# Patient Record
Sex: MALE | Race: WHITE | NOT HISPANIC OR LATINO
[De-identification: names, ages, dates, MRNs, and addresses within clinical notes are randomized per-mention and may not be internally consistent; named-entity substitution may affect disease eponyms.]

---

## 2018-02-09 ENCOUNTER — APPOINTMENT (OUTPATIENT)
Dept: UROLOGY | Facility: CLINIC | Age: 78
End: 2018-02-09
Payer: MEDICARE

## 2018-02-09 ENCOUNTER — RESULT CHARGE (OUTPATIENT)
Age: 78
End: 2018-02-09

## 2018-02-09 ENCOUNTER — APPOINTMENT (OUTPATIENT)
Dept: UROLOGY | Facility: CLINIC | Age: 78
End: 2018-02-09

## 2018-02-09 VITALS
HEART RATE: 77 BPM | TEMPERATURE: 98.8 F | SYSTOLIC BLOOD PRESSURE: 181 MMHG | DIASTOLIC BLOOD PRESSURE: 67 MMHG | RESPIRATION RATE: 16 BRPM

## 2018-02-09 DIAGNOSIS — N48.1 BALANITIS: ICD-10-CM

## 2018-02-09 LAB
BILIRUB UR QL STRIP: NORMAL
CLARITY UR: CLEAR
COLLECTION METHOD: NORMAL
GLUCOSE UR-MCNC: NORMAL
HCG UR QL: 0.2 EU/DL
HGB UR QL STRIP.AUTO: NORMAL
KETONES UR-MCNC: NORMAL
LEUKOCYTE ESTERASE UR QL STRIP: NORMAL
NITRITE UR QL STRIP: NORMAL
PH UR STRIP: 6
PROT UR STRIP-MCNC: NORMAL
SP GR UR STRIP: 1.01

## 2018-02-09 PROCEDURE — 51798 US URINE CAPACITY MEASURE: CPT

## 2018-02-09 PROCEDURE — 99213 OFFICE O/P EST LOW 20 MIN: CPT

## 2018-02-09 RX ORDER — CLOTRIMAZOLE AND BETAMETHASONE DIPROPIONATE 10; .5 MG/G; MG/G
1-0.05 CREAM TOPICAL TWICE DAILY
Qty: 45 | Refills: 1 | Status: ACTIVE | COMMUNITY
Start: 2018-02-09 | End: 1900-01-01

## 2018-02-13 LAB — CORE LAB FLUID CYTOLOGY: NORMAL

## 2018-02-16 ENCOUNTER — EMERGENCY (EMERGENCY)
Facility: HOSPITAL | Age: 78
LOS: 1 days | Discharge: HOME | End: 2018-02-16
Attending: EMERGENCY MEDICINE
Payer: MEDICARE

## 2018-02-16 ENCOUNTER — OUTPATIENT (OUTPATIENT)
Dept: OUTPATIENT SERVICES | Facility: HOSPITAL | Age: 78
LOS: 1 days | End: 2018-02-16

## 2018-02-16 ENCOUNTER — APPOINTMENT (OUTPATIENT)
Dept: MRI IMAGING | Facility: CLINIC | Age: 78
End: 2018-02-16
Payer: MEDICARE

## 2018-02-16 VITALS
TEMPERATURE: 98 F | DIASTOLIC BLOOD PRESSURE: 73 MMHG | SYSTOLIC BLOOD PRESSURE: 156 MMHG | HEIGHT: 65 IN | WEIGHT: 192.02 LBS | OXYGEN SATURATION: 94 % | RESPIRATION RATE: 18 BRPM | HEART RATE: 67 BPM

## 2018-02-16 DIAGNOSIS — Z98.890 OTHER SPECIFIED POSTPROCEDURAL STATES: Chronic | ICD-10-CM

## 2018-02-16 DIAGNOSIS — R31.0 GROSS HEMATURIA: ICD-10-CM

## 2018-02-16 LAB
ALBUMIN SERPL ELPH-MCNC: 4.2 G/DL — SIGNIFICANT CHANGE UP (ref 3.3–5.2)
ALP SERPL-CCNC: 112 U/L — SIGNIFICANT CHANGE UP (ref 40–120)
ALT FLD-CCNC: 15 U/L — SIGNIFICANT CHANGE UP
ANION GAP SERPL CALC-SCNC: 19 MMOL/L — HIGH (ref 5–17)
APPEARANCE UR: ABNORMAL
AST SERPL-CCNC: 20 U/L — SIGNIFICANT CHANGE UP
BASOPHILS NFR BLD AUTO: 1 % — SIGNIFICANT CHANGE UP (ref 0–2)
BILIRUB SERPL-MCNC: 0.8 MG/DL — SIGNIFICANT CHANGE UP (ref 0.4–2)
BILIRUB UR-MCNC: NEGATIVE — SIGNIFICANT CHANGE UP
BUN SERPL-MCNC: 34 MG/DL — HIGH (ref 8–20)
CALCIUM SERPL-MCNC: 9.3 MG/DL — SIGNIFICANT CHANGE UP (ref 8.6–10.2)
CHLORIDE SERPL-SCNC: 91 MMOL/L — LOW (ref 98–107)
CO2 SERPL-SCNC: 24 MMOL/L — SIGNIFICANT CHANGE UP (ref 22–29)
COLOR SPEC: ABNORMAL
CREAT SERPL-MCNC: 1.45 MG/DL — HIGH (ref 0.5–1.3)
DIFF PNL FLD: ABNORMAL
EOSINOPHIL NFR BLD AUTO: 2 % — SIGNIFICANT CHANGE UP (ref 0–6)
GLUCOSE SERPL-MCNC: 111 MG/DL — SIGNIFICANT CHANGE UP (ref 70–115)
GLUCOSE UR QL: NEGATIVE MG/DL — SIGNIFICANT CHANGE UP
HCT VFR BLD CALC: 40.4 % — LOW (ref 42–52)
HGB BLD-MCNC: 13.6 G/DL — LOW (ref 14–18)
KETONES UR-MCNC: NEGATIVE — SIGNIFICANT CHANGE UP
LEUKOCYTE ESTERASE UR-ACNC: ABNORMAL
LYMPHOCYTES # BLD AUTO: 26 % — SIGNIFICANT CHANGE UP (ref 20–55)
MCHC RBC-ENTMCNC: 30.4 PG — SIGNIFICANT CHANGE UP (ref 27–31)
MCHC RBC-ENTMCNC: 33.7 G/DL — SIGNIFICANT CHANGE UP (ref 32–36)
MCV RBC AUTO: 90.4 FL — SIGNIFICANT CHANGE UP (ref 80–94)
MONOCYTES NFR BLD AUTO: 5 % — SIGNIFICANT CHANGE UP (ref 3–10)
NEUTROPHILS NFR BLD AUTO: 50 % — SIGNIFICANT CHANGE UP (ref 37–73)
NEUTS BAND # BLD: 2 % — SIGNIFICANT CHANGE UP (ref 0–8)
NITRITE UR-MCNC: NEGATIVE — SIGNIFICANT CHANGE UP
PH UR: 6 — SIGNIFICANT CHANGE UP (ref 5–8)
PLAT MORPH BLD: NORMAL — SIGNIFICANT CHANGE UP
PLATELET # BLD AUTO: 144 K/UL — LOW (ref 150–400)
POTASSIUM SERPL-MCNC: 4.4 MMOL/L — SIGNIFICANT CHANGE UP (ref 3.5–5.3)
POTASSIUM SERPL-SCNC: 4.4 MMOL/L — SIGNIFICANT CHANGE UP (ref 3.5–5.3)
PROT SERPL-MCNC: 7 G/DL — SIGNIFICANT CHANGE UP (ref 6.6–8.7)
PROT UR-MCNC: 100 MG/DL
RBC # BLD: 4.47 M/UL — LOW (ref 4.6–6.2)
RBC # FLD: 13.2 % — SIGNIFICANT CHANGE UP (ref 11–15.6)
RBC BLD AUTO: NORMAL — SIGNIFICANT CHANGE UP
RBC CASTS # UR COMP ASSIST: >50 /HPF (ref 0–4)
SODIUM SERPL-SCNC: 134 MMOL/L — LOW (ref 135–145)
SP GR SPEC: 1.01 — SIGNIFICANT CHANGE UP (ref 1.01–1.02)
UROBILINOGEN FLD QL: 1 MG/DL
VARIANT LYMPHS # BLD: 14 % — HIGH (ref 0–6)
WBC # BLD: 13.8 K/UL — HIGH (ref 4.8–10.8)
WBC # FLD AUTO: 13.8 K/UL — HIGH (ref 4.8–10.8)
WBC UR QL: SIGNIFICANT CHANGE UP

## 2018-02-16 PROCEDURE — 85027 COMPLETE CBC AUTOMATED: CPT

## 2018-02-16 PROCEDURE — 74183 MRI ABD W/O CNTR FLWD CNTR: CPT | Mod: 26

## 2018-02-16 PROCEDURE — 86900 BLOOD TYPING SEROLOGIC ABO: CPT

## 2018-02-16 PROCEDURE — 86880 COOMBS TEST DIRECT: CPT

## 2018-02-16 PROCEDURE — 86850 RBC ANTIBODY SCREEN: CPT

## 2018-02-16 PROCEDURE — 86901 BLOOD TYPING SEROLOGIC RH(D): CPT

## 2018-02-16 PROCEDURE — 80053 COMPREHEN METABOLIC PANEL: CPT

## 2018-02-16 PROCEDURE — 99284 EMERGENCY DEPT VISIT MOD MDM: CPT

## 2018-02-16 PROCEDURE — 72197 MRI PELVIS W/O & W/DYE: CPT | Mod: 26

## 2018-02-16 PROCEDURE — 36415 COLL VENOUS BLD VENIPUNCTURE: CPT

## 2018-02-16 PROCEDURE — 87186 SC STD MICRODIL/AGAR DIL: CPT

## 2018-02-16 PROCEDURE — 99283 EMERGENCY DEPT VISIT LOW MDM: CPT

## 2018-02-16 PROCEDURE — 81001 URINALYSIS AUTO W/SCOPE: CPT

## 2018-02-16 PROCEDURE — 87086 URINE CULTURE/COLONY COUNT: CPT

## 2018-02-16 NOTE — ED ADULT NURSE NOTE - OBJECTIVE STATEMENT
pt reports bloody urination began last thursday, cleared up and then began again today after MRI. pt reports bloody urination began last thursday, cleared up and then began again today after MRI. pt denies pain with urination but reports feeling like he still needs to urinate after going.

## 2018-02-17 LAB
BLD GP AB SCN SERPL QL: SIGNIFICANT CHANGE UP
DIR ANTIGLOB POLYSPECIFIC INTERPRETATION: SIGNIFICANT CHANGE UP
TYPE + AB SCN PNL BLD: SIGNIFICANT CHANGE UP

## 2018-02-18 LAB
-  AMIKACIN: SIGNIFICANT CHANGE UP
-  AMIKACIN: SIGNIFICANT CHANGE UP
-  AMPICILLIN/SULBACTAM: SIGNIFICANT CHANGE UP
-  AMPICILLIN/SULBACTAM: SIGNIFICANT CHANGE UP
-  AMPICILLIN: SIGNIFICANT CHANGE UP
-  AMPICILLIN: SIGNIFICANT CHANGE UP
-  AZTREONAM: SIGNIFICANT CHANGE UP
-  AZTREONAM: SIGNIFICANT CHANGE UP
-  CEFAZOLIN: SIGNIFICANT CHANGE UP
-  CEFAZOLIN: SIGNIFICANT CHANGE UP
-  CEFEPIME: SIGNIFICANT CHANGE UP
-  CEFEPIME: SIGNIFICANT CHANGE UP
-  CEFOXITIN: SIGNIFICANT CHANGE UP
-  CEFOXITIN: SIGNIFICANT CHANGE UP
-  CEFTAZIDIME: SIGNIFICANT CHANGE UP
-  CEFTAZIDIME: SIGNIFICANT CHANGE UP
-  CEFTRIAXONE: SIGNIFICANT CHANGE UP
-  CEFTRIAXONE: SIGNIFICANT CHANGE UP
-  CIPROFLOXACIN: SIGNIFICANT CHANGE UP
-  CIPROFLOXACIN: SIGNIFICANT CHANGE UP
-  ERTAPENEM: SIGNIFICANT CHANGE UP
-  ERTAPENEM: SIGNIFICANT CHANGE UP
-  GENTAMICIN: SIGNIFICANT CHANGE UP
-  GENTAMICIN: SIGNIFICANT CHANGE UP
-  IMIPENEM: SIGNIFICANT CHANGE UP
-  IMIPENEM: SIGNIFICANT CHANGE UP
-  LEVOFLOXACIN: SIGNIFICANT CHANGE UP
-  LEVOFLOXACIN: SIGNIFICANT CHANGE UP
-  MEROPENEM: SIGNIFICANT CHANGE UP
-  MEROPENEM: SIGNIFICANT CHANGE UP
-  NITROFURANTOIN: SIGNIFICANT CHANGE UP
-  NITROFURANTOIN: SIGNIFICANT CHANGE UP
-  PIPERACILLIN/TAZOBACTAM: SIGNIFICANT CHANGE UP
-  PIPERACILLIN/TAZOBACTAM: SIGNIFICANT CHANGE UP
-  TOBRAMYCIN: SIGNIFICANT CHANGE UP
-  TOBRAMYCIN: SIGNIFICANT CHANGE UP
-  TRIMETHOPRIM/SULFAMETHOXAZOLE: SIGNIFICANT CHANGE UP
-  TRIMETHOPRIM/SULFAMETHOXAZOLE: SIGNIFICANT CHANGE UP
CULTURE RESULTS: SIGNIFICANT CHANGE UP
METHOD TYPE: SIGNIFICANT CHANGE UP
METHOD TYPE: SIGNIFICANT CHANGE UP
ORGANISM # SPEC MICROSCOPIC CNT: SIGNIFICANT CHANGE UP
SPECIMEN SOURCE: SIGNIFICANT CHANGE UP

## 2018-02-21 ENCOUNTER — APPOINTMENT (OUTPATIENT)
Dept: UROLOGY | Facility: CLINIC | Age: 78
End: 2018-02-21
Payer: MEDICARE

## 2018-02-21 PROCEDURE — 81003 URINALYSIS AUTO W/O SCOPE: CPT | Mod: QW

## 2018-02-21 PROCEDURE — 52000 CYSTOURETHROSCOPY: CPT

## 2018-02-22 LAB
BILIRUB UR QL STRIP: NEGATIVE
CLARITY UR: CLEAR
COLLECTION METHOD: NORMAL
GLUCOSE UR-MCNC: 100
HCG UR QL: 0.2 EU/DL
HGB UR QL STRIP.AUTO: NORMAL
KETONES UR-MCNC: NEGATIVE
LEUKOCYTE ESTERASE UR QL STRIP: NEGATIVE
NITRITE UR QL STRIP: NEGATIVE
PH UR STRIP: 7
PROT UR STRIP-MCNC: NEGATIVE
SP GR UR STRIP: 1.01

## 2018-02-26 NOTE — ED PROVIDER NOTE - OBJECTIVE STATEMENT
78 yo male pmh prostate ca with artificial sphincer comes to ed with hematuria x 1 week. pt is under to care of Dr Casas who ordered mri for work up of hematuria; denies trauma, dizziness, pain on urination

## 2018-03-06 ENCOUNTER — OUTPATIENT (OUTPATIENT)
Dept: OUTPATIENT SERVICES | Facility: HOSPITAL | Age: 78
LOS: 1 days | End: 2018-03-06
Payer: MEDICARE

## 2018-03-06 VITALS
DIASTOLIC BLOOD PRESSURE: 83 MMHG | SYSTOLIC BLOOD PRESSURE: 186 MMHG | TEMPERATURE: 97 F | WEIGHT: 187.39 LBS | HEART RATE: 80 BPM | HEIGHT: 65 IN | RESPIRATION RATE: 16 BRPM

## 2018-03-06 DIAGNOSIS — Z98.890 OTHER SPECIFIED POSTPROCEDURAL STATES: Chronic | ICD-10-CM

## 2018-03-06 DIAGNOSIS — Z01.818 ENCOUNTER FOR OTHER PREPROCEDURAL EXAMINATION: ICD-10-CM

## 2018-03-06 DIAGNOSIS — R31.0 GROSS HEMATURIA: ICD-10-CM

## 2018-03-06 LAB
ALBUMIN SERPL ELPH-MCNC: 4.3 G/DL — SIGNIFICANT CHANGE UP (ref 3.3–5.2)
ALP SERPL-CCNC: 114 U/L — SIGNIFICANT CHANGE UP (ref 40–120)
ALT FLD-CCNC: 18 U/L — SIGNIFICANT CHANGE UP
ANION GAP SERPL CALC-SCNC: 14 MMOL/L — SIGNIFICANT CHANGE UP (ref 5–17)
APPEARANCE UR: CLEAR — SIGNIFICANT CHANGE UP
APTT BLD: 33.8 SEC — SIGNIFICANT CHANGE UP (ref 27.5–37.4)
AST SERPL-CCNC: 19 U/L — SIGNIFICANT CHANGE UP
BILIRUB SERPL-MCNC: 0.8 MG/DL — SIGNIFICANT CHANGE UP (ref 0.4–2)
BILIRUB UR-MCNC: NEGATIVE — SIGNIFICANT CHANGE UP
BLD GP AB SCN SERPL QL: ABNORMAL
BUN SERPL-MCNC: 26 MG/DL — HIGH (ref 8–20)
CALCIUM SERPL-MCNC: 10.4 MG/DL — HIGH (ref 8.6–10.2)
CHLORIDE SERPL-SCNC: 94 MMOL/L — LOW (ref 98–107)
CO2 SERPL-SCNC: 28 MMOL/L — SIGNIFICANT CHANGE UP (ref 22–29)
COLOR SPEC: YELLOW — SIGNIFICANT CHANGE UP
CREAT SERPL-MCNC: 1.19 MG/DL — SIGNIFICANT CHANGE UP (ref 0.5–1.3)
DAT IGG-SP REAG RBC-IMP: SIGNIFICANT CHANGE UP
DIFF PNL FLD: NEGATIVE — SIGNIFICANT CHANGE UP
DIR ANTIGLOB POLYSPECIFIC INTERPRETATION: ABNORMAL
GLUCOSE SERPL-MCNC: 111 MG/DL — SIGNIFICANT CHANGE UP (ref 70–115)
GLUCOSE UR QL: NEGATIVE MG/DL — SIGNIFICANT CHANGE UP
HCT VFR BLD CALC: 39.3 % — LOW (ref 42–52)
HGB BLD-MCNC: 13.6 G/DL — LOW (ref 14–18)
IAT COMP-SP REAG SERPL QL: ABNORMAL
INR BLD: 1.04 RATIO — SIGNIFICANT CHANGE UP (ref 0.88–1.16)
KETONES UR-MCNC: NEGATIVE — SIGNIFICANT CHANGE UP
LEUKOCYTE ESTERASE UR-ACNC: NEGATIVE — SIGNIFICANT CHANGE UP
MCHC RBC-ENTMCNC: 31.1 PG — HIGH (ref 27–31)
MCHC RBC-ENTMCNC: 34.6 G/DL — SIGNIFICANT CHANGE UP (ref 32–36)
MCV RBC AUTO: 89.7 FL — SIGNIFICANT CHANGE UP (ref 80–94)
NITRITE UR-MCNC: NEGATIVE — SIGNIFICANT CHANGE UP
PH UR: 7 — SIGNIFICANT CHANGE UP (ref 5–8)
PLATELET # BLD AUTO: 161 K/UL — SIGNIFICANT CHANGE UP (ref 150–400)
POTASSIUM SERPL-MCNC: 3.8 MMOL/L — SIGNIFICANT CHANGE UP (ref 3.5–5.3)
POTASSIUM SERPL-SCNC: 3.8 MMOL/L — SIGNIFICANT CHANGE UP (ref 3.5–5.3)
PROT SERPL-MCNC: 7 G/DL — SIGNIFICANT CHANGE UP (ref 6.6–8.7)
PROT UR-MCNC: NEGATIVE MG/DL — SIGNIFICANT CHANGE UP
PROTHROM AB SERPL-ACNC: 11.5 SEC — SIGNIFICANT CHANGE UP (ref 9.8–12.7)
RBC # BLD: 4.38 M/UL — LOW (ref 4.6–6.2)
RBC # FLD: 13 % — SIGNIFICANT CHANGE UP (ref 11–15.6)
SODIUM SERPL-SCNC: 136 MMOL/L — SIGNIFICANT CHANGE UP (ref 135–145)
SP GR SPEC: 1 — LOW (ref 1.01–1.02)
TYPE + AB SCN PNL BLD: SIGNIFICANT CHANGE UP
UROBILINOGEN FLD QL: NEGATIVE MG/DL — SIGNIFICANT CHANGE UP
WBC # BLD: 11.5 K/UL — HIGH (ref 4.8–10.8)
WBC # FLD AUTO: 11.5 K/UL — HIGH (ref 4.8–10.8)

## 2018-03-06 PROCEDURE — 93010 ELECTROCARDIOGRAM REPORT: CPT

## 2018-03-06 PROCEDURE — 86077 PHYS BLOOD BANK SERV XMATCH: CPT

## 2018-03-06 RX ORDER — ASPIRIN/CALCIUM CARB/MAGNESIUM 324 MG
1 TABLET ORAL
Qty: 0 | Refills: 0 | COMMUNITY

## 2018-03-06 RX ORDER — CEFAZOLIN SODIUM 1 G
2000 VIAL (EA) INJECTION ONCE
Qty: 0 | Refills: 0 | Status: DISCONTINUED | OUTPATIENT
Start: 2018-03-14 | End: 2018-03-29

## 2018-03-06 NOTE — H&P PST ADULT - GASTROINTESTINAL DETAILS
soft/nontender/bowel sounds normal/no masses palpable/no distention/no rebound tenderness/no rigidity/no bruit/no guarding/no organomegaly/normal

## 2018-03-06 NOTE — H&P PST ADULT - NEGATIVE BREAST SYMPTOMS
no nipple discharge L/no breast lump R/no breast tenderness R/no nipple discharge R/no breast tenderness L/no breast lump L

## 2018-03-06 NOTE — H&P PST ADULT - HISTORY OF PRESENT ILLNESS
76 y/o male was treated in ED for gross hematuria on examination was diagnosed with bladder tumor patient now presents for cystoscopy bladder biopsies transurethral resection of bladder tumor fulguration left retrograde  left retrograde pyelogram possible left ureteroscopy biopsy stent replacement

## 2018-03-06 NOTE — H&P PST ADULT - NEGATIVE SKIN SYMPTOMS
no brittle nails/no change in size/color of mole/no hair loss/no itching/no tumor/no pitted nails/no rash

## 2018-03-06 NOTE — H&P PST ADULT - NEGATIVE GENERAL SYMPTOMS
no chills/no sweating/no anorexia/no weight gain/no malaise/no polyphagia/no polyuria/no weight loss/no polydipsia/no fatigue/no fever

## 2018-03-06 NOTE — H&P PST ADULT - NEGATIVE CARDIOVASCULAR SYMPTOMS
no palpitations/no peripheral edema/no paroxysmal nocturnal dyspnea/no claudication/no dyspnea on exertion/no orthopnea/no chest pain

## 2018-03-06 NOTE — H&P PST ADULT - NEUROLOGICAL DETAILS
responds to verbal commands/no spontaneous movement/sensation intact/deep reflexes intact/cranial nerves intact/superficial reflexes intact/normal strength/responds to pain/alert and oriented x 3

## 2018-03-06 NOTE — H&P PST ADULT - NEGATIVE ENMT SYMPTOMS
no gum bleeding/no throat pain/no nasal obstruction/no post-nasal discharge/no abnormal taste sensation/no dry mouth/no vertigo/no nasal congestion/no ear pain/no tinnitus/no nose bleeds/no recurrent cold sores/no nasal discharge/no sinus symptoms/no hearing difficulty/no dysphagia

## 2018-03-06 NOTE — H&P PST ADULT - NEGATIVE MALE-SPECIFIC SYMPTOMS
no urethral discharge/no ejaculatory dysfunction/no scrotal mass R/no undescended testicle R/no erectile dysfunction/no scrotal mass L/no genital sores/no impotence/no undescended testicle L

## 2018-03-06 NOTE — H&P PST ADULT - RS GEN PE MLT RESP DETAILS PC
normal/no wheezes/no subcutaneous emphysema/respirations non-labored/airway patent/breath sounds equal/clear to auscultation bilaterally/no intercostal retractions/good air movement/no rales/no rhonchi/no chest wall tenderness

## 2018-03-06 NOTE — H&P PST ADULT - MUSCULOSKELETAL
details… detailed exam no joint erythema/ROM intact/no calf tenderness/no joint warmth/normal/no joint swelling/normal strength

## 2018-03-06 NOTE — H&P PST ADULT - NSANTHOSAYNRD_GEN_A_CORE
No. [place] screening performed.  STOP BANG Legend: 0-2 = LOW Risk; 3-4 = INTERMEDIATE Risk; 5-8 = HIGH Risk

## 2018-03-06 NOTE — H&P PST ADULT - NEGATIVE PSYCHIATRIC SYMPTOMS
no anxiety/no memory loss/no agitation/no hyperactivity/no suicidal ideation/no paranoia/no visual hallucinations/no depression/no insomnia/no mood swings/no auditory hallucinations

## 2018-03-06 NOTE — H&P PST ADULT - NEGATIVE OPHTHALMOLOGIC SYMPTOMS
no loss of vision L/no lacrimation R/no pain R/no discharge L/no lacrimation L/no blurred vision L/no blurred vision R/no irritation R/no discharge R/no pain L/no irritation L/no loss of vision R/no scleral injection L/no scleral injection R/no diplopia/no photophobia

## 2018-03-06 NOTE — H&P PST ADULT - NEGATIVE GASTROINTESTINAL SYMPTOMS
no constipation/no diarrhea/no nausea/no flatulence/no abdominal pain/no hematochezia/no steatorrhea/no hiccoughs/no vomiting/no melena/no jaundice/no change in bowel habits

## 2018-03-06 NOTE — H&P PST ADULT - NEGATIVE MUSCULOSKELETAL SYMPTOMS
no myalgia/no leg pain R/no arm pain L/no back pain/no leg pain L/no arthralgia/no arthritis/no stiffness/no neck pain/no arm pain R/no muscle cramps/no muscle weakness/no joint swelling

## 2018-03-06 NOTE — H&P PST ADULT - PROBLEM SELECTOR PLAN 1
cystoscopy bladder biopsies transurethral resection of bladder tumor fulguration left retrograde  left retrograde pyelogram possible left ureteroscopy biopsy stent replacement

## 2018-03-06 NOTE — H&P PST ADULT - NEGATIVE GENERAL GENITOURINARY SYMPTOMS
no hematuria/no gas in urine/normal libido/no urine discoloration/normal urinary frequency/no incontinence/no dysuria/no flank pain L/no bladder infections/no renal colic/no flank pain R

## 2018-03-06 NOTE — H&P PST ADULT - PMH
Cancer of prostate with low recurrence risk (stage T1-2a and Rizwan < 7 and PSA < 10)  2010 treated with radiation  Gout    HTN - Hypertension    Hypercholesterolemia    Prostate cancer  2009  Sciatica  pt denies recent c/o

## 2018-03-06 NOTE — H&P PST ADULT - NEGATIVE NEUROLOGICAL SYMPTOMS
no facial palsy/no hemiparesis/no loss of consciousness/no syncope/no tremors/no weakness/no vertigo/no loss of sensation/no difficulty walking/no transient paralysis/no paresthesias/no confusion/no focal seizures/no generalized seizures/no headache

## 2018-03-07 LAB
CULTURE RESULTS: NO GROWTH — SIGNIFICANT CHANGE UP
SPECIMEN SOURCE: SIGNIFICANT CHANGE UP

## 2018-03-08 ENCOUNTER — OUTPATIENT (OUTPATIENT)
Dept: OUTPATIENT SERVICES | Facility: HOSPITAL | Age: 78
LOS: 1 days | End: 2018-03-08
Payer: MEDICARE

## 2018-03-08 DIAGNOSIS — Z01.812 ENCOUNTER FOR PREPROCEDURAL LABORATORY EXAMINATION: ICD-10-CM

## 2018-03-12 LAB — ALLERGY+IMMUNOLOGY DIAG STUDY NOTE: SIGNIFICANT CHANGE UP

## 2018-03-12 PROCEDURE — 85730 THROMBOPLASTIN TIME PARTIAL: CPT

## 2018-03-12 PROCEDURE — 86850 RBC ANTIBODY SCREEN: CPT

## 2018-03-12 PROCEDURE — 87086 URINE CULTURE/COLONY COUNT: CPT

## 2018-03-12 PROCEDURE — 86900 BLOOD TYPING SEROLOGIC ABO: CPT

## 2018-03-12 PROCEDURE — 36415 COLL VENOUS BLD VENIPUNCTURE: CPT

## 2018-03-12 PROCEDURE — 93005 ELECTROCARDIOGRAM TRACING: CPT

## 2018-03-12 PROCEDURE — G0463: CPT

## 2018-03-12 PROCEDURE — 86901 BLOOD TYPING SEROLOGIC RH(D): CPT

## 2018-03-12 PROCEDURE — 85027 COMPLETE CBC AUTOMATED: CPT

## 2018-03-12 PROCEDURE — 86880 COOMBS TEST DIRECT: CPT

## 2018-03-12 PROCEDURE — 81003 URINALYSIS AUTO W/O SCOPE: CPT

## 2018-03-12 PROCEDURE — 85610 PROTHROMBIN TIME: CPT

## 2018-03-12 PROCEDURE — 80053 COMPREHEN METABOLIC PANEL: CPT

## 2018-03-12 PROCEDURE — 86870 RBC ANTIBODY IDENTIFICATION: CPT

## 2018-03-13 ENCOUNTER — TRANSCRIPTION ENCOUNTER (OUTPATIENT)
Age: 78
End: 2018-03-13

## 2018-03-14 ENCOUNTER — APPOINTMENT (OUTPATIENT)
Dept: UROLOGY | Facility: HOSPITAL | Age: 78
End: 2018-03-14

## 2018-03-14 ENCOUNTER — OTHER (OUTPATIENT)
Age: 78
End: 2018-03-14

## 2018-03-14 ENCOUNTER — OUTPATIENT (OUTPATIENT)
Dept: OUTPATIENT SERVICES | Facility: HOSPITAL | Age: 78
LOS: 1 days | End: 2018-03-14
Payer: MEDICARE

## 2018-03-14 ENCOUNTER — RESULT REVIEW (OUTPATIENT)
Age: 78
End: 2018-03-14

## 2018-03-14 VITALS
HEART RATE: 68 BPM | TEMPERATURE: 97 F | SYSTOLIC BLOOD PRESSURE: 179 MMHG | RESPIRATION RATE: 16 BRPM | OXYGEN SATURATION: 97 % | WEIGHT: 186.95 LBS | DIASTOLIC BLOOD PRESSURE: 60 MMHG | HEIGHT: 65 IN

## 2018-03-14 VITALS
OXYGEN SATURATION: 98 % | DIASTOLIC BLOOD PRESSURE: 50 MMHG | RESPIRATION RATE: 15 BRPM | TEMPERATURE: 99 F | SYSTOLIC BLOOD PRESSURE: 152 MMHG | HEART RATE: 66 BPM

## 2018-03-14 DIAGNOSIS — R31.0 GROSS HEMATURIA: ICD-10-CM

## 2018-03-14 DIAGNOSIS — R32 UNSPECIFIED URINARY INCONTINENCE: ICD-10-CM

## 2018-03-14 DIAGNOSIS — C61 MALIGNANT NEOPLASM OF PROSTATE: ICD-10-CM

## 2018-03-14 PROCEDURE — 36415 COLL VENOUS BLD VENIPUNCTURE: CPT

## 2018-03-14 PROCEDURE — 88305 TISSUE EXAM BY PATHOLOGIST: CPT

## 2018-03-14 PROCEDURE — 52204 CYSTOSCOPY W/BIOPSY(S): CPT

## 2018-03-14 PROCEDURE — 74420 UROGRAPHY RTRGR +-KUB: CPT

## 2018-03-14 PROCEDURE — 76000 FLUOROSCOPY <1 HR PHYS/QHP: CPT

## 2018-03-14 PROCEDURE — 88305 TISSUE EXAM BY PATHOLOGIST: CPT | Mod: 26

## 2018-03-14 PROCEDURE — 52234 CYSTOSCOPY AND TREATMENT: CPT

## 2018-03-14 PROCEDURE — 52007 CYSTO URTRL CATHJ BRUSH BX: CPT

## 2018-03-14 RX ORDER — ONDANSETRON 8 MG/1
4 TABLET, FILM COATED ORAL ONCE
Qty: 0 | Refills: 0 | Status: DISCONTINUED | OUTPATIENT
Start: 2018-03-14 | End: 2018-03-15

## 2018-03-14 RX ORDER — CEPHALEXIN 500 MG
1 CAPSULE ORAL
Qty: 10 | Refills: 0
Start: 2018-03-14 | End: 2018-03-18

## 2018-03-14 RX ORDER — FENTANYL CITRATE 50 UG/ML
25 INJECTION INTRAVENOUS
Qty: 0 | Refills: 0 | Status: DISCONTINUED | OUTPATIENT
Start: 2018-03-14 | End: 2018-03-15

## 2018-03-14 RX ORDER — SODIUM CHLORIDE 9 MG/ML
3 INJECTION INTRAMUSCULAR; INTRAVENOUS; SUBCUTANEOUS ONCE
Qty: 0 | Refills: 0 | Status: DISCONTINUED | OUTPATIENT
Start: 2018-03-14 | End: 2018-03-14

## 2018-03-14 RX ORDER — SODIUM CHLORIDE 9 MG/ML
500 INJECTION, SOLUTION INTRAVENOUS
Qty: 0 | Refills: 0 | Status: DISCONTINUED | OUTPATIENT
Start: 2018-03-14 | End: 2018-03-14

## 2018-03-14 RX ORDER — SODIUM CHLORIDE 9 MG/ML
1000 INJECTION, SOLUTION INTRAVENOUS
Qty: 0 | Refills: 0 | Status: DISCONTINUED | OUTPATIENT
Start: 2018-03-14 | End: 2018-03-15

## 2018-03-14 NOTE — BRIEF OPERATIVE NOTE - PROCEDURE
<<-----Click on this checkbox to enter Procedure Cystoscopy with biopsy and fulguration of bladder and retrograde pyelography  03/14/2018    Active  LMINSKY

## 2018-03-14 NOTE — ASU DISCHARGE PLAN (ADULT/PEDIATRIC). - NOTIFY
Pain not relieved by Medications/Inability to Tolerate Liquids or Foods/Bleeding that does not stop/Fever greater than 101/Numbness, tingling/Swelling that continues/Persistent Nausea and Vomiting

## 2018-03-14 NOTE — ASU DISCHARGE PLAN (ADULT/PEDIATRIC). - MEDICATION SUMMARY - MEDICATIONS TO TAKE
I will START or STAY ON the medications listed below when I get home from the hospital:    allopurinol 300 mg oral tablet  -- 1 tab(s) by mouth once a day  -- Indication: For as per pmd    pravastatin 20 mg oral tablet  -- 1 tab(s) by mouth once a day  -- Indication: For as per pmd    losartan-hydroCHLOROthiazide 100 mg-25 mg oral tablet  -- 1 tab(s) by mouth once a day  -- Indication: For as per pmd    atenolol 50 mg oral tablet  -- 2 tab(s) by mouth once a day  -- Indication: For as per pmd    atenolol 50 mg oral tablet  -- 1 tab(s) by mouth once a day (at bedtime)  -- Indication: For as per pmd    Keflex 500 mg oral capsule  -- 1 cap(s) by mouth 2 times a day   -- Finish all this medication unless otherwise directed by prescriber.    -- Indication: For post op antibiotics    Vitamin D3 1000 intl units oral capsule  -- 1 cap(s) by mouth once a day  -- Indication: For as per pmd

## 2018-03-16 ENCOUNTER — APPOINTMENT (OUTPATIENT)
Dept: UROLOGY | Facility: CLINIC | Age: 78
End: 2018-03-16
Payer: MEDICARE

## 2018-03-16 VITALS
TEMPERATURE: 98.6 F | SYSTOLIC BLOOD PRESSURE: 183 MMHG | HEART RATE: 74 BPM | DIASTOLIC BLOOD PRESSURE: 80 MMHG | RESPIRATION RATE: 16 BRPM

## 2018-03-16 DIAGNOSIS — M10.9 GOUT, UNSPECIFIED: ICD-10-CM

## 2018-03-16 LAB — SURGICAL PATHOLOGY FINAL REPORT - CH: SIGNIFICANT CHANGE UP

## 2018-03-16 PROCEDURE — 99212 OFFICE O/P EST SF 10 MIN: CPT

## 2018-03-16 PROCEDURE — 51798 US URINE CAPACITY MEASURE: CPT

## 2018-03-16 RX ORDER — CEPHALEXIN 500 MG/1
500 CAPSULE ORAL
Qty: 10 | Refills: 0 | Status: ACTIVE | COMMUNITY
Start: 2018-03-14

## 2018-03-16 RX ORDER — ALLOPURINOL 100 MG/1
100 TABLET ORAL
Refills: 0 | Status: ACTIVE | COMMUNITY
Start: 2018-03-16

## 2018-04-10 ENCOUNTER — APPOINTMENT (OUTPATIENT)
Dept: UROLOGY | Facility: CLINIC | Age: 78
End: 2018-04-10
Payer: MEDICARE

## 2018-04-10 VITALS — HEART RATE: 80 BPM | DIASTOLIC BLOOD PRESSURE: 80 MMHG | SYSTOLIC BLOOD PRESSURE: 132 MMHG | TEMPERATURE: 98.4 F

## 2018-04-10 DIAGNOSIS — R31.0 GROSS HEMATURIA: ICD-10-CM

## 2018-04-10 PROCEDURE — 81003 URINALYSIS AUTO W/O SCOPE: CPT | Mod: QW

## 2018-04-10 PROCEDURE — 99213 OFFICE O/P EST LOW 20 MIN: CPT

## 2018-04-11 LAB
BILIRUB UR QL STRIP: NORMAL
CLARITY UR: NORMAL
COLLECTION METHOD: NORMAL
GLUCOSE UR-MCNC: NORMAL
HCG UR QL: 0.2 EU/DL
HGB UR QL STRIP.AUTO: NORMAL
KETONES UR-MCNC: NORMAL
LEUKOCYTE ESTERASE UR QL STRIP: NORMAL
NITRITE UR QL STRIP: NORMAL
PH UR STRIP: 6.5
PROT UR STRIP-MCNC: NORMAL
SP GR UR STRIP: 1.01

## 2018-05-01 PROBLEM — R31.0 GROSS HEMATURIA: Status: ACTIVE | Noted: 2018-02-09

## 2018-05-11 NOTE — ED PROVIDER NOTE - CROS ED CARDIOVAS ALL NEG
CARDIOVASCULAR - ADULT     Maintains optimal cardiac output and hemodynamic stability Adequate for Discharge     Absence of cardiac dysrhythmias or at baseline rhythm Adequate for Discharge        DISCHARGE PLANNING - CARE MANAGEMENT     Discharge to post-acute care or home with appropriate resources Adequate for Discharge        Nutrition/Hydration-ADULT     Nutrient/Hydration intake appropriate for improving, restoring or maintaining nutritional needs Adequate for Discharge        Potential for Falls     Patient will remain free of falls Adequate for Discharge        Prexisting or High Potential for Compromised Skin Integrity     Skin integrity is maintained or improved Adequate for Discharge        RESPIRATORY - ADULT     Achieves optimal ventilation and oxygenation Adequate for Discharge negative...

## 2018-07-17 PROBLEM — I10 ESSENTIAL (PRIMARY) HYPERTENSION: Chronic | Status: INACTIVE | Noted: 2018-02-16 | Resolved: 2018-03-06

## 2018-08-14 PROBLEM — C61 MALIGNANT NEOPLASM OF PROSTATE: Chronic | Status: ACTIVE | Noted: 2018-02-16

## 2018-08-14 PROBLEM — C61 MALIGNANT NEOPLASM OF PROSTATE: Chronic | Status: ACTIVE | Noted: 2018-03-06

## 2018-10-10 ENCOUNTER — APPOINTMENT (OUTPATIENT)
Dept: UROLOGY | Facility: CLINIC | Age: 78
End: 2018-10-10

## 2019-07-07 ENCOUNTER — TRANSCRIPTION ENCOUNTER (OUTPATIENT)
Age: 79
End: 2019-07-07

## 2020-06-05 NOTE — H&P PST ADULT - AS O2 DELIVERY
Anesthesia Pre Eval Note    Anesthesia ROS/Med Hx    Overall Review:  EKG was reviewed       Pulmonary Review:  Patient does not have a pulmonary history      Neuro/Psych Review:    Positive for psychiatric history - Anxiety    Cardiovascular Review:    Positive for hypertension    GI/HEPATIC/RENAL Review:  Patient does not have a GI/hepatic/renalhistory       End/Other Review:  Patient does not have an endo/other history        Relevant Problems   No relevant active problems       Physical Exam     Airway   Mallampati: I  TM Distance: >3 FB  Neck ROM: Full    Cardiovascular  Cardiovascular exam normal    Dental Exam  Dental exam normal    Pulmonary Exam  Pulmonary exam normal    Abdominal Exam  Abdominal exam normal      Anesthesia Plan    Phone call attempted:   Case discussed with Patient or Representative    ASA Status: 2    Anesthesia Type: General    Induction: Intravenous  Preferred Airway Type: ETT    Checklist  Reviewed: Allergies, Medications, Problem list, Past Med History, NPO Status, Patient Summary, EKG and Lab Results    Informed Consent  The proposed anesthetic plan, including its risks and benefits, have been discussed with the Patient  - along with the risks and benefits of alternatives.  Questions were encouraged and answered and the patient and/or representative understands and agrees to proceed.       Comments  Plan Comments: R/B of general anesthesia/MAC/peripheral nerve blocks discussed with patient/patient's representative including but not limited to prolonged nerve blockade, cardiac complications, respiratory complications, CNS complications, nausea and vomiting, sore throat, and dental injury. Questions answered and patient wishes to proceed.      
room air

## 2020-10-16 ENCOUNTER — APPOINTMENT (OUTPATIENT)
Dept: RADIOLOGY | Facility: CLINIC | Age: 80
End: 2020-10-16
Payer: MEDICARE

## 2020-10-16 PROCEDURE — 77075 RADEX OSSEOUS SURVEY COMPL: CPT

## 2020-12-15 ENCOUNTER — OUTPATIENT (OUTPATIENT)
Dept: OUTPATIENT SERVICES | Facility: HOSPITAL | Age: 80
LOS: 1 days | End: 2020-12-15
Payer: MEDICARE

## 2020-12-15 PROCEDURE — 74177 CT ABD & PELVIS W/CONTRAST: CPT | Mod: 26

## 2021-01-01 ENCOUNTER — OUTPATIENT (OUTPATIENT)
Dept: OUTPATIENT SERVICES | Facility: HOSPITAL | Age: 81
LOS: 1 days | End: 2021-01-01

## 2021-01-01 ENCOUNTER — INPATIENT (INPATIENT)
Facility: HOSPITAL | Age: 81
LOS: 2 days | Discharge: HOME | End: 2021-01-04
Payer: MEDICARE

## 2021-01-01 PROCEDURE — 99285 EMERGENCY DEPT VISIT HI MDM: CPT

## 2021-01-01 PROCEDURE — 93010 ELECTROCARDIOGRAM REPORT: CPT

## 2021-01-01 PROCEDURE — 71045 X-RAY EXAM CHEST 1 VIEW: CPT | Mod: 26

## 2021-01-02 ENCOUNTER — OUTPATIENT (OUTPATIENT)
Dept: OUTPATIENT SERVICES | Facility: HOSPITAL | Age: 81
LOS: 1 days | End: 2021-01-02

## 2021-01-02 PROCEDURE — 74176 CT ABD & PELVIS W/O CONTRAST: CPT | Mod: 26

## 2021-01-02 PROCEDURE — 71250 CT THORAX DX C-: CPT | Mod: 26

## 2021-01-03 ENCOUNTER — OUTPATIENT (OUTPATIENT)
Dept: OUTPATIENT SERVICES | Facility: HOSPITAL | Age: 81
LOS: 1 days | End: 2021-01-03

## 2021-01-04 ENCOUNTER — OUTPATIENT (OUTPATIENT)
Dept: OUTPATIENT SERVICES | Facility: HOSPITAL | Age: 81
LOS: 1 days | End: 2021-01-04

## 2021-01-20 ENCOUNTER — EMERGENCY (EMERGENCY)
Facility: HOSPITAL | Age: 81
LOS: 1 days | End: 2021-01-20
Admitting: EMERGENCY MEDICINE
Payer: MEDICARE

## 2021-01-20 ENCOUNTER — INPATIENT (INPATIENT)
Facility: HOSPITAL | Age: 81
LOS: 3 days | Discharge: HOME | DRG: 86 | End: 2021-01-24
Attending: HOSPITALIST | Admitting: NEUROLOGICAL SURGERY
Payer: MEDICARE

## 2021-01-20 VITALS — HEIGHT: 65 IN

## 2021-01-20 DIAGNOSIS — I61.9 NONTRAUMATIC INTRACEREBRAL HEMORRHAGE, UNSPECIFIED: ICD-10-CM

## 2021-01-20 LAB
ALBUMIN SERPL ELPH-MCNC: 3.2 G/DL — LOW (ref 3.3–5.2)
ALP SERPL-CCNC: 134 U/L — HIGH (ref 40–120)
ALT FLD-CCNC: 19 U/L — SIGNIFICANT CHANGE UP
ANION GAP SERPL CALC-SCNC: 10 MMOL/L — SIGNIFICANT CHANGE UP (ref 5–17)
ANISOCYTOSIS BLD QL: SLIGHT — SIGNIFICANT CHANGE UP
APTT BLD: 28.2 SEC — SIGNIFICANT CHANGE UP (ref 27.5–35.5)
AST SERPL-CCNC: 42 U/L — HIGH
BASOPHILS # BLD AUTO: 0 K/UL — SIGNIFICANT CHANGE UP (ref 0–0.2)
BASOPHILS NFR BLD AUTO: 0 % — SIGNIFICANT CHANGE UP (ref 0–2)
BILIRUB SERPL-MCNC: 0.9 MG/DL — SIGNIFICANT CHANGE UP (ref 0.4–2)
BUN SERPL-MCNC: 21 MG/DL — HIGH (ref 8–20)
CALCIUM SERPL-MCNC: 8 MG/DL — LOW (ref 8.6–10.2)
CHLORIDE SERPL-SCNC: 84 MMOL/L — LOW (ref 98–107)
CO2 SERPL-SCNC: 23 MMOL/L — SIGNIFICANT CHANGE UP (ref 22–29)
CREAT SERPL-MCNC: 1.13 MG/DL — SIGNIFICANT CHANGE UP (ref 0.5–1.3)
EOSINOPHIL # BLD AUTO: 0.02 K/UL — SIGNIFICANT CHANGE UP (ref 0–0.5)
EOSINOPHIL NFR BLD AUTO: 0.9 % — SIGNIFICANT CHANGE UP (ref 0–6)
GIANT PLATELETS BLD QL SMEAR: PRESENT — SIGNIFICANT CHANGE UP
GLUCOSE SERPL-MCNC: 116 MG/DL — HIGH (ref 70–99)
HCT VFR BLD CALC: 23.4 % — LOW (ref 39–50)
HGB BLD-MCNC: 8 G/DL — LOW (ref 13–17)
INR BLD: 1.08 RATIO — SIGNIFICANT CHANGE UP (ref 0.88–1.16)
LYMPHOCYTES # BLD AUTO: 0.39 K/UL — LOW (ref 1–3.3)
LYMPHOCYTES # BLD AUTO: 15.9 % — SIGNIFICANT CHANGE UP (ref 13–44)
MANUAL SMEAR VERIFICATION: SIGNIFICANT CHANGE UP
MCHC RBC-ENTMCNC: 31.1 PG — SIGNIFICANT CHANGE UP (ref 27–34)
MCHC RBC-ENTMCNC: 34.2 GM/DL — SIGNIFICANT CHANGE UP (ref 32–36)
MCV RBC AUTO: 91.1 FL — SIGNIFICANT CHANGE UP (ref 80–100)
MONOCYTES # BLD AUTO: 0.11 K/UL — SIGNIFICANT CHANGE UP (ref 0–0.9)
MONOCYTES NFR BLD AUTO: 4.4 % — SIGNIFICANT CHANGE UP (ref 2–14)
NEUTROPHILS # BLD AUTO: 1.89 K/UL — SIGNIFICANT CHANGE UP (ref 1.8–7.4)
NEUTROPHILS NFR BLD AUTO: 77 % — SIGNIFICANT CHANGE UP (ref 43–77)
PLAT MORPH BLD: NORMAL — SIGNIFICANT CHANGE UP
PLATELET # BLD AUTO: 124 K/UL — LOW (ref 150–400)
POLYCHROMASIA BLD QL SMEAR: SLIGHT — SIGNIFICANT CHANGE UP
POTASSIUM SERPL-MCNC: 5.4 MMOL/L — HIGH (ref 3.5–5.3)
POTASSIUM SERPL-SCNC: 5.4 MMOL/L — HIGH (ref 3.5–5.3)
PROT SERPL-MCNC: 5.7 G/DL — LOW (ref 6.6–8.7)
PROTHROM AB SERPL-ACNC: 12.5 SEC — SIGNIFICANT CHANGE UP (ref 10.6–13.6)
RBC # BLD: 2.57 M/UL — LOW (ref 4.2–5.8)
RBC # FLD: 15.6 % — HIGH (ref 10.3–14.5)
RBC BLD AUTO: ABNORMAL
SODIUM SERPL-SCNC: 117 MMOL/L — CRITICAL LOW (ref 135–145)
VARIANT LYMPHS # BLD: 1.8 % — SIGNIFICANT CHANGE UP (ref 0–6)
WBC # BLD: 2.46 K/UL — LOW (ref 3.8–10.5)
WBC # FLD AUTO: 2.46 K/UL — LOW (ref 3.8–10.5)

## 2021-01-20 PROCEDURE — 99285 EMERGENCY DEPT VISIT HI MDM: CPT

## 2021-01-20 PROCEDURE — 71045 X-RAY EXAM CHEST 1 VIEW: CPT | Mod: 26

## 2021-01-20 PROCEDURE — 70450 CT HEAD/BRAIN W/O DYE: CPT | Mod: 26

## 2021-01-20 PROCEDURE — 72125 CT NECK SPINE W/O DYE: CPT | Mod: 26

## 2021-01-20 PROCEDURE — 71045 X-RAY EXAM CHEST 1 VIEW: CPT | Mod: 26,77

## 2021-01-20 PROCEDURE — 93010 ELECTROCARDIOGRAM REPORT: CPT

## 2021-01-20 PROCEDURE — 72170 X-RAY EXAM OF PELVIS: CPT | Mod: 26

## 2021-01-20 PROCEDURE — 99232 SBSQ HOSP IP/OBS MODERATE 35: CPT | Mod: GC,57

## 2021-01-20 PROCEDURE — 99291 CRITICAL CARE FIRST HOUR: CPT

## 2021-01-20 PROCEDURE — 99222 1ST HOSP IP/OBS MODERATE 55: CPT

## 2021-01-20 RX ORDER — SODIUM CHLORIDE 5 G/100ML
500 INJECTION, SOLUTION INTRAVENOUS
Refills: 0 | Status: DISCONTINUED | OUTPATIENT
Start: 2021-01-20 | End: 2021-01-21

## 2021-01-20 RX ORDER — SODIUM CHLORIDE 9 MG/ML
1000 INJECTION INTRAMUSCULAR; INTRAVENOUS; SUBCUTANEOUS
Refills: 0 | Status: DISCONTINUED | OUTPATIENT
Start: 2021-01-20 | End: 2021-01-20

## 2021-01-20 RX ORDER — CHLORHEXIDINE GLUCONATE 213 G/1000ML
1 SOLUTION TOPICAL
Refills: 0 | Status: DISCONTINUED | OUTPATIENT
Start: 2021-01-20 | End: 2021-01-21

## 2021-01-20 RX ORDER — SODIUM CHLORIDE 5 G/100ML
500 INJECTION, SOLUTION INTRAVENOUS
Refills: 0 | Status: DISCONTINUED | OUTPATIENT
Start: 2021-01-20 | End: 2021-01-22

## 2021-01-20 RX ORDER — SODIUM CHLORIDE 9 MG/ML
1000 INJECTION INTRAMUSCULAR; INTRAVENOUS; SUBCUTANEOUS ONCE
Refills: 0 | Status: COMPLETED | OUTPATIENT
Start: 2021-01-20 | End: 2021-01-20

## 2021-01-20 RX ADMIN — SODIUM CHLORIDE 1000 MILLILITER(S): 9 INJECTION INTRAMUSCULAR; INTRAVENOUS; SUBCUTANEOUS at 22:29

## 2021-01-20 RX ADMIN — SODIUM CHLORIDE 30 MILLILITER(S): 5 INJECTION, SOLUTION INTRAVENOUS at 23:37

## 2021-01-20 NOTE — H&P ADULT - ASSESSMENT
81yo male with metastatic lung cancer    syncopal fall with LOC --> found to have Right sided intraventricular hemorrhage    CXR and pelvis without abnormalities    CSpine without acute abnormalities    FAST exam without acute intraabdominal pathology    Cleared from trauma surgery perspective for Neurosurgery and Neuro ICU admission

## 2021-01-20 NOTE — ED PROVIDER NOTE - CONSTITUTIONAL, MLM
normal... Well appearing, awake, alert, oriented to person, place, time/situation and in no apparent distress. GCS 15

## 2021-01-20 NOTE — H&P ADULT - HISTORY OF PRESENT ILLNESS
80y Male BIB [facility] as transfer from [facility] after syncopal ground level fall with known right intraventricular hemorrhage without any other isolated injury    Reports he was walking with his walker and then fell likely syncopal vs mechanical, came to with wife trying to arouse him.  Reportedly 1-2min LOC.  Denies bowel or bladder dysfunction.       Patient denies fevers/chills, denies lightheadedness/dizziness, denies SOB/chest pain, denies nausea/vomiting, denies constipation/diarrhea.     A airway intact, C collar previously cleared at [facility],  speaking full sentences  B equal breath sounds bilaterally  C radial/DP/femoral pulses intact bilaterally   D GCS15 [id number], moving all fours, no focal deficits, pupils R 3mm reactive/L 3mm reactive  E patient fully exposed, no gross deformities or bleeding, provided warm blankets    CXR no fracture or hemopneumothorax  FAST negative    Initial vitals:     Secondary survey remarkable for ***    ROS: 10-system review is otherwise negative except HPI above.      PAST MEDICAL & SURGICAL HISTORY:  Cancer of prostate with low recurrence risk (stage T1-2a and Rizwan &lt; 7 and PSA &lt; 10)  2010 treated with radiation  Prostate cancer  2009  Sciatica  Gout  Hypercholesterolemia  HTN - Hypertension  Excision Cataract OS  2010  Robotic Assisted Prostatectomy  9/27/2008  Lumbar Fusion     FAMILY HISTORY:  Family history of colon cancer in father (Father)    SOCIAL HISTORY:  Lives with wife    ALLERGIES: Allergy Status Unknown  No Known Allergies      HOME MEDICATIONS: ***    --------------------------------------------------------------------------------------------    PHYSICAL EXAM: ***  General: NAD, Lying in bed comfortably  Neuro: A+Ox3  HEENT: NC/AT, EOMI  Neck: Soft, supple  Cardio: RRR, nml S1/S2  Resp: Good effort, CTA b/l  Thorax: No chest wall tenderness  Breast: No lesions/masses, no drainage  GI/Abd: Soft, NT/ND, no rebound/guarding, no masses palpated  Vascular: All 4 extremities warm.  Skin: Intact, no breakdown  Lymphatic/Nodes: No palpable lymphadenopathy  Pelvis: stable  Musculoskeletal: All 4 extremities moving spontaneously, no limitations, no spinal tenderness or stepoffs

## 2021-01-20 NOTE — ED ADULT TRIAGE NOTE - INPATIENT TRANSFER HOLDING STATEMENT
Alert-The patient is alert, awake and responds to voice. The patient is oriented to time, place, and person. The triage nurse is able to obtain subjective information. Inpatient transfer holding in the Emergency Department, waiting for an Inpatient Bed.

## 2021-01-20 NOTE — ED PROVIDER NOTE - CLINICAL SUMMARY MEDICAL DECISION MAKING FREE TEXT BOX
81 y/o M with a significant PMHx of prostate cancer(surgically removed in 2009), Gout, HTN, hypercholesterolemia, and sciatica presents to the ED as a transfer from [facility] due to a intracranial hemorrhage caused by fall at the pt home. 81 y/o M with a significant PMHx of prostate cancer(surgically removed in 2009), Gout, HTN, hypercholesterolemia, and sciatica presents to the ED as a transfer from [facility] due to a intracranial hemorrhage caused by fall at the pt home. Pt cleared by trauma service to go to NSICU - admit for further management. Pt found to be hyponatremic at [facility] will start fluids 81 y/o M with a significant PMHx of prostate cancer(surgically removed in 2009), Gout, HTN, hypercholesterolemia, and sciatica presents to the ED as a transfer from [facility] due to a intracranial hemorrhage caused by fall at the pt home. Pt cleared by trauma service to go to NSICU - admit for further management. Pt found to be hyponatremic at [facility] will start fluids, as per transfer center CTA chest was not done at [facility] - admitting service made aware

## 2021-01-20 NOTE — ED PROVIDER NOTE - OBJECTIVE STATEMENT
79 y/o M with a significant PMHx of prostate cancer(surgically removed in 2009), Gout, HTN, hypercholesterolemia, and sciatica presents to the ED as a transfer from [facility] due to a intracranial hemorrhage caused by fall at the pt home. Pt was found to have a glucose of 119 en route to the ED. Pt was standing in his kitchen, walking with a walker, when he synopsized and fell backwards; pt wife came ot his attention and noticed that he was less coherent than usual. Pt states he hit his head when he fell- pt denies any pain immediately before or after the fall. Pt notes that he is now feeling lightheaded. Pt was recently diagnosed with spinal cancer in December of 2020. Pt was operated on(laminectomy) at [facility] 12/19/2020. Pt started radiation, however Pt was supposed to start his chemotherapy treatment today, but he was not given his treatment due to low NA levels. Pt is a former smoker(quit 25y ago). Pt denies any illicit drug use or excessive EtOH use. Pt states he has a family history of prostate cancer. Pt was given sodium at [place]. Code Trauma B called due to intracranial hemorrhage.

## 2021-01-20 NOTE — H&P ADULT - ATTENDING COMMENTS
79 yo M with metastatic lung cancer was a [facility] after a syncopal fall with LOC where he was found to have Right sided intraventricular hemorrhage and left eye hemangioma. He presented as a trauma B where he had a negative CXR, Pelvic xray and FAST  AAOx3, NAD, GCS 15  PUL CTA  CV RRR  GI benign  MS DAVIS  Plan  1. Patient cleared from trauma standpoint for admission to [facility]   2. Tertiary eval in the AM    code [id number]

## 2021-01-20 NOTE — ED ADULT TRIAGE NOTE - CHIEF COMPLAINT QUOTE
patient transfer from [facility] s/p fall at home, patient with a subdural hematoma. code trauma B called

## 2021-01-20 NOTE — CONSULT NOTE ADULT - ASSESSMENT
80M s/p syncope fall with small right IVH     Plan   Admit to NSICU     Neuro  - q1 neuro checks   - Repeat CT Head in AM, or sooner if clinically warranted     Cards  - -150   - Restart home Losartan, Atenolol, Pravastatin when passes dysphagia   - Echo/EKG for syncope work up   - Tele monitor   - Possible cards consult for ILR pending clinical course     Resp   - Maintain SpO2 > 92%     GI:   - Dysphagia then diet     :   - Strict intake and output   - NS @ 75/hr while NPO     ID   - COVID pending   - WBC 2, afebrile, known cancer with mets not on chemo at this time, follows with [facility]     Heme:   - Hgb 8, trend   - on ASA 81mg    Endo:   - No DM hx    80M s/p syncope fall with small right IVH     Plan   Admit to NSICU     Neuro  - q1 neuro checks   - Repeat CT Head in AM, or sooner if clinically warranted     Cards  - -150   - Restart home Losartan, Atenolol, Pravastatin when passes dysphagia   - Echo/EKG for syncope work up   - Tele monitor   - Possible cards consult for ILR pending clinical course     Resp   - Maintain SpO2 > 92%     GI:   - Dysphagia then diet     :   - Strict intake and output   - NS @ 75/hr while NPO     ID   - COVID pending   - WBC 2, afebrile, known cancer with mets not on chemo at this time, follows with [facility]     Heme:   - Hgb 8, trend   - on ASA 81mg - per neurosurgeon hold on, no DDAVP      Endo:   - No DM hx    80M s/p syncope fall with small right IVH     Plan   Admit to NSICU     Neuro  - q1 neuro checks   - Repeat CT Head in AM, or sooner if clinically warranted     Cards  - -150   - Restart home Losartan, Atenolol, Pravastatin when passes dysphagia   - Echo/EKG for syncope work up   - Tele monitor   - Possible cards consult for ILR pending clinical course     Resp   - Maintain SpO2 > 92%     GI:   - Dysphagia then diet     :   - Strict intake and output   - NS @ 75/hr while NPO   - Na 117 here, 120 at [facility] will start 3% @ 60 with goal inc Na 10-12 MeQ / 24 hrs   - Urine lytes for hyponatremia work up  - Hold Losartan in setting of hyponatremia     ID   - COVID pending   - WBC 2, afebrile, known cancer with mets not on chemo at this time, follows with [facility]     Heme:   - Hgb 8, trend   - on ASA 81mg - per neurosurgeon hold on, no DDAVP      Endo:   - No DM hx

## 2021-01-21 LAB
ALLERGY+IMMUNOLOGY DIAG STUDY NOTE: SIGNIFICANT CHANGE UP
ANION GAP SERPL CALC-SCNC: 10 MMOL/L — SIGNIFICANT CHANGE UP (ref 5–17)
ANION GAP SERPL CALC-SCNC: 11 MMOL/L — SIGNIFICANT CHANGE UP (ref 5–17)
ANION GAP SERPL CALC-SCNC: 8 MMOL/L — SIGNIFICANT CHANGE UP (ref 5–17)
ANION GAP SERPL CALC-SCNC: 9 MMOL/L — SIGNIFICANT CHANGE UP (ref 5–17)
APPEARANCE UR: CLEAR — SIGNIFICANT CHANGE UP
BASOPHILS # BLD AUTO: 0 K/UL — SIGNIFICANT CHANGE UP (ref 0–0.2)
BASOPHILS NFR BLD AUTO: 0 % — SIGNIFICANT CHANGE UP (ref 0–2)
BILIRUB UR-MCNC: NEGATIVE — SIGNIFICANT CHANGE UP
BLD GP AB SCN SERPL QL: SIGNIFICANT CHANGE UP
BLD GP AB SCN SERPL QL: SIGNIFICANT CHANGE UP
BUN SERPL-MCNC: 15 MG/DL — SIGNIFICANT CHANGE UP (ref 8–20)
BUN SERPL-MCNC: 16 MG/DL — SIGNIFICANT CHANGE UP (ref 8–20)
BUN SERPL-MCNC: 16 MG/DL — SIGNIFICANT CHANGE UP (ref 8–20)
BUN SERPL-MCNC: 17 MG/DL — SIGNIFICANT CHANGE UP (ref 8–20)
CALCIUM SERPL-MCNC: 7.5 MG/DL — LOW (ref 8.6–10.2)
CALCIUM SERPL-MCNC: 7.9 MG/DL — LOW (ref 8.6–10.2)
CALCIUM SERPL-MCNC: 8 MG/DL — LOW (ref 8.6–10.2)
CALCIUM SERPL-MCNC: 8.4 MG/DL — LOW (ref 8.6–10.2)
CHLORIDE SERPL-SCNC: 112 MMOL/L — HIGH (ref 98–107)
CHLORIDE SERPL-SCNC: 91 MMOL/L — LOW (ref 98–107)
CHLORIDE SERPL-SCNC: 92 MMOL/L — LOW (ref 98–107)
CHLORIDE SERPL-SCNC: 95 MMOL/L — LOW (ref 98–107)
CO2 SERPL-SCNC: 23 MMOL/L — SIGNIFICANT CHANGE UP (ref 22–29)
CO2 SERPL-SCNC: 23 MMOL/L — SIGNIFICANT CHANGE UP (ref 22–29)
CO2 SERPL-SCNC: 24 MMOL/L — SIGNIFICANT CHANGE UP (ref 22–29)
CO2 SERPL-SCNC: 25 MMOL/L — SIGNIFICANT CHANGE UP (ref 22–29)
COLOR SPEC: YELLOW — SIGNIFICANT CHANGE UP
CREAT ?TM UR-MCNC: 55 MG/DL — SIGNIFICANT CHANGE UP
CREAT SERPL-MCNC: 0.95 MG/DL — SIGNIFICANT CHANGE UP (ref 0.5–1.3)
CREAT SERPL-MCNC: 1 MG/DL — SIGNIFICANT CHANGE UP (ref 0.5–1.3)
CREAT SERPL-MCNC: 1.03 MG/DL — SIGNIFICANT CHANGE UP (ref 0.5–1.3)
CREAT SERPL-MCNC: 1.04 MG/DL — SIGNIFICANT CHANGE UP (ref 0.5–1.3)
DIFF PNL FLD: ABNORMAL
DIR ANTIGLOB POLYSPECIFIC INTERPRETATION: SIGNIFICANT CHANGE UP
EOSINOPHIL # BLD AUTO: 0.03 K/UL — SIGNIFICANT CHANGE UP (ref 0–0.5)
EOSINOPHIL NFR BLD AUTO: 1.5 % — SIGNIFICANT CHANGE UP (ref 0–6)
EPI CELLS # UR: SIGNIFICANT CHANGE UP
GLUCOSE SERPL-MCNC: 100 MG/DL — HIGH (ref 70–99)
GLUCOSE SERPL-MCNC: 112 MG/DL — HIGH (ref 70–99)
GLUCOSE SERPL-MCNC: 87 MG/DL — SIGNIFICANT CHANGE UP (ref 70–99)
GLUCOSE SERPL-MCNC: 91 MG/DL — SIGNIFICANT CHANGE UP (ref 70–99)
GLUCOSE UR QL: NEGATIVE MG/DL — SIGNIFICANT CHANGE UP
HCT VFR BLD CALC: 20.4 % — CRITICAL LOW (ref 39–50)
HCT VFR BLD CALC: 21.5 % — LOW (ref 39–50)
HGB BLD-MCNC: 7.1 G/DL — LOW (ref 13–17)
HGB BLD-MCNC: 7.4 G/DL — LOW (ref 13–17)
IMM GRANULOCYTES NFR BLD AUTO: 1 % — SIGNIFICANT CHANGE UP (ref 0–1.5)
KETONES UR-MCNC: ABNORMAL
LEUKOCYTE ESTERASE UR-ACNC: ABNORMAL
LYMPHOCYTES # BLD AUTO: 0.35 K/UL — LOW (ref 1–3.3)
LYMPHOCYTES # BLD AUTO: 17.2 % — SIGNIFICANT CHANGE UP (ref 13–44)
MAGNESIUM SERPL-MCNC: 1.2 MG/DL — LOW (ref 1.6–2.6)
MCHC RBC-ENTMCNC: 30.8 PG — SIGNIFICANT CHANGE UP (ref 27–34)
MCHC RBC-ENTMCNC: 31.1 PG — SIGNIFICANT CHANGE UP (ref 27–34)
MCHC RBC-ENTMCNC: 34.4 GM/DL — SIGNIFICANT CHANGE UP (ref 32–36)
MCHC RBC-ENTMCNC: 34.8 GM/DL — SIGNIFICANT CHANGE UP (ref 32–36)
MCV RBC AUTO: 89.5 FL — SIGNIFICANT CHANGE UP (ref 80–100)
MCV RBC AUTO: 89.6 FL — SIGNIFICANT CHANGE UP (ref 80–100)
MONOCYTES # BLD AUTO: 0.21 K/UL — SIGNIFICANT CHANGE UP (ref 0–0.9)
MONOCYTES NFR BLD AUTO: 10.3 % — SIGNIFICANT CHANGE UP (ref 2–14)
NEUTROPHILS # BLD AUTO: 1.43 K/UL — LOW (ref 1.8–7.4)
NEUTROPHILS NFR BLD AUTO: 70 % — SIGNIFICANT CHANGE UP (ref 43–77)
NITRITE UR-MCNC: NEGATIVE — SIGNIFICANT CHANGE UP
OSMOLALITY UR: 464 MOSM/KG — SIGNIFICANT CHANGE UP (ref 300–1000)
OSMOLALITY UR: 604 MOSM/KG — SIGNIFICANT CHANGE UP (ref 300–1000)
PH UR: 6 — SIGNIFICANT CHANGE UP (ref 5–8)
PHOSPHATE SERPL-MCNC: 2.7 MG/DL — SIGNIFICANT CHANGE UP (ref 2.4–4.7)
PLATELET # BLD AUTO: 111 K/UL — LOW (ref 150–400)
PLATELET # BLD AUTO: 121 K/UL — LOW (ref 150–400)
POTASSIUM SERPL-MCNC: 3.7 MMOL/L — SIGNIFICANT CHANGE UP (ref 3.5–5.3)
POTASSIUM SERPL-MCNC: 3.8 MMOL/L — SIGNIFICANT CHANGE UP (ref 3.5–5.3)
POTASSIUM SERPL-MCNC: 3.9 MMOL/L — SIGNIFICANT CHANGE UP (ref 3.5–5.3)
POTASSIUM SERPL-MCNC: 3.9 MMOL/L — SIGNIFICANT CHANGE UP (ref 3.5–5.3)
POTASSIUM SERPL-SCNC: 3.7 MMOL/L — SIGNIFICANT CHANGE UP (ref 3.5–5.3)
POTASSIUM SERPL-SCNC: 3.8 MMOL/L — SIGNIFICANT CHANGE UP (ref 3.5–5.3)
POTASSIUM SERPL-SCNC: 3.9 MMOL/L — SIGNIFICANT CHANGE UP (ref 3.5–5.3)
POTASSIUM SERPL-SCNC: 3.9 MMOL/L — SIGNIFICANT CHANGE UP (ref 3.5–5.3)
PROT UR-MCNC: 30 MG/DL
RBC # BLD: 2.28 M/UL — LOW (ref 4.2–5.8)
RBC # BLD: 2.4 M/UL — LOW (ref 4.2–5.8)
RBC # FLD: 15.6 % — HIGH (ref 10.3–14.5)
RBC # FLD: 15.7 % — HIGH (ref 10.3–14.5)
RBC CASTS # UR COMP ASSIST: SIGNIFICANT CHANGE UP /HPF (ref 0–4)
SARS-COV-2 IGG SERPL QL IA: NEGATIVE — SIGNIFICANT CHANGE UP
SARS-COV-2 IGM SERPL IA-ACNC: 0.07 INDEX — SIGNIFICANT CHANGE UP
SODIUM SERPL-SCNC: 125 MMOL/L — LOW (ref 135–145)
SODIUM SERPL-SCNC: 126 MMOL/L — LOW (ref 135–145)
SODIUM SERPL-SCNC: 128 MMOL/L — LOW (ref 135–145)
SODIUM SERPL-SCNC: 144 MMOL/L — SIGNIFICANT CHANGE UP (ref 135–145)
SODIUM UR-SCNC: 120 MMOL/L — SIGNIFICANT CHANGE UP
SODIUM UR-SCNC: 124 MMOL/L — SIGNIFICANT CHANGE UP
SP GR SPEC: 1.01 — SIGNIFICANT CHANGE UP (ref 1.01–1.02)
UROBILINOGEN FLD QL: NEGATIVE MG/DL — SIGNIFICANT CHANGE UP
WBC # BLD: 2.04 K/UL — LOW (ref 3.8–10.5)
WBC # BLD: 2.29 K/UL — LOW (ref 3.8–10.5)
WBC # FLD AUTO: 2.04 K/UL — LOW (ref 3.8–10.5)
WBC # FLD AUTO: 2.29 K/UL — LOW (ref 3.8–10.5)
WBC UR QL: SIGNIFICANT CHANGE UP

## 2021-01-21 PROCEDURE — 70450 CT HEAD/BRAIN W/O DYE: CPT | Mod: 26

## 2021-01-21 PROCEDURE — 44950 APPENDECTOMY: CPT

## 2021-01-21 PROCEDURE — 70553 MRI BRAIN STEM W/O & W/DYE: CPT | Mod: 26

## 2021-01-21 PROCEDURE — 93010 ELECTROCARDIOGRAM REPORT: CPT

## 2021-01-21 PROCEDURE — 99497 ADVNCD CARE PLAN 30 MIN: CPT | Mod: 25

## 2021-01-21 PROCEDURE — 99222 1ST HOSP IP/OBS MODERATE 55: CPT

## 2021-01-21 PROCEDURE — 99223 1ST HOSP IP/OBS HIGH 75: CPT

## 2021-01-21 PROCEDURE — 99222 1ST HOSP IP/OBS MODERATE 55: CPT | Mod: 24

## 2021-01-21 PROCEDURE — 93970 EXTREMITY STUDY: CPT | Mod: 26

## 2021-01-21 PROCEDURE — 99233 SBSQ HOSP IP/OBS HIGH 50: CPT

## 2021-01-21 RX ORDER — ATENOLOL 25 MG/1
1 TABLET ORAL
Qty: 0 | Refills: 0 | DISCHARGE

## 2021-01-21 RX ORDER — POTASSIUM CHLORIDE 20 MEQ
20 PACKET (EA) ORAL ONCE
Refills: 0 | Status: DISCONTINUED | OUTPATIENT
Start: 2021-01-21 | End: 2021-01-21

## 2021-01-21 RX ORDER — ALLOPURINOL 300 MG
300 TABLET ORAL DAILY
Refills: 0 | Status: DISCONTINUED | OUTPATIENT
Start: 2021-01-21 | End: 2021-01-24

## 2021-01-21 RX ORDER — ATENOLOL 25 MG/1
2 TABLET ORAL
Qty: 0 | Refills: 0 | DISCHARGE

## 2021-01-21 RX ORDER — LOSARTAN/HYDROCHLOROTHIAZIDE 100MG-25MG
1 TABLET ORAL
Qty: 0 | Refills: 0 | DISCHARGE

## 2021-01-21 RX ORDER — ALLOPURINOL 300 MG
1 TABLET ORAL
Qty: 0 | Refills: 0 | DISCHARGE

## 2021-01-21 RX ORDER — ATORVASTATIN CALCIUM 80 MG/1
10 TABLET, FILM COATED ORAL AT BEDTIME
Refills: 0 | Status: DISCONTINUED | OUTPATIENT
Start: 2021-01-21 | End: 2021-01-24

## 2021-01-21 RX ORDER — MAGNESIUM SULFATE 500 MG/ML
4 VIAL (ML) INJECTION ONCE
Refills: 0 | Status: COMPLETED | OUTPATIENT
Start: 2021-01-21 | End: 2021-01-21

## 2021-01-21 RX ORDER — ESCITALOPRAM OXALATE 10 MG/1
1 TABLET, FILM COATED ORAL
Qty: 0 | Refills: 0 | DISCHARGE

## 2021-01-21 RX ORDER — ATENOLOL 25 MG/1
100 TABLET ORAL DAILY
Refills: 0 | Status: DISCONTINUED | OUTPATIENT
Start: 2021-01-21 | End: 2021-01-24

## 2021-01-21 RX ORDER — ATENOLOL 25 MG/1
50 TABLET ORAL AT BEDTIME
Refills: 0 | Status: DISCONTINUED | OUTPATIENT
Start: 2021-01-21 | End: 2021-01-24

## 2021-01-21 RX ORDER — CHOLECALCIFEROL (VITAMIN D3) 125 MCG
1 CAPSULE ORAL
Qty: 0 | Refills: 0 | DISCHARGE

## 2021-01-21 RX ADMIN — ATENOLOL 50 MILLIGRAM(S): 25 TABLET ORAL at 21:09

## 2021-01-21 RX ADMIN — ATORVASTATIN CALCIUM 10 MILLIGRAM(S): 80 TABLET, FILM COATED ORAL at 21:09

## 2021-01-21 RX ADMIN — Medication 100 GRAM(S): at 06:27

## 2021-01-21 RX ADMIN — CHLORHEXIDINE GLUCONATE 1 APPLICATION(S): 213 SOLUTION TOPICAL at 06:28

## 2021-01-21 NOTE — PHYSICAL THERAPY INITIAL EVALUATION ADULT - PERTINENT HX OF CURRENT PROBLEM, REHAB EVAL
80y Male BIB [facility] as transfer from [facility] after syncopal ground level fall with known right intraventricular hemorrhage

## 2021-01-21 NOTE — CONSULT NOTE ADULT - SUBJECTIVE AND OBJECTIVE BOX
REASON FOR CONSULTATION    HPI:  80M with a history of gallbladder and prostate cancer with metastatic disease to the spine, with recent 2021 cancer recurrence-Patient is [place] [facility] [place] Dr Musa  . Transported to  [facility] after a Syncopal episode that led to LOC, after which he was then transferred to [facility] with Nontraumatic ICH. He has been kept NPO- since ICH will have a swallow eval soon. RR 21 POX 91-99%  He has been OOB/CH generalized weakness no unilateral weakness.   Was on  Losartan / HCTZ PTA  Recent addition of Lexapro     Patient was initially diagnosed with prostate cancer s/p resection in  , Rt in .   About 2-3 months ago patient developed severe pain in back / spine. with L spine involvement s/p [place] [facility] [place]. and subsequent Rt to area. This is thought to be of primary Biliary origin   Patient was due to start chemotherapy however could not receive treatment due to low counts   Today CBC witth WBC of 2.2, Hb 7.4 plt 121, ANC 1.4   No fevers       PAST MEDICAL & SURGICAL HISTORY:  Cancer of prostate with low recurrence risk (stage T1-2a and [place] &lt; 7 and PSA &lt; 10)  2010 treated with radiation  Prostate cancer  2009  Sciatica  Gout  Hypercholesterolemia  HTN - Hypertension  Excision Cataract OS    Robotic Assisted Prostatectomy  2008  Lumbar Fusion     FAMILY HISTORY:  Family history of colon cancer in father (Father)    SOCIAL HISTORY:  Lives with wife    ALLERGIES: Allergy Status Unknown  No Known Allergies      HOME MEDICATIONS: ***    --------------------------------------------------------------------------------------------    PHYSICAL EXAM: ***  General: NAD, Lying in bed comfortably  Neuro: A+Ox3  HEENT: NC/AT, EOMI  Neck: Soft, supple  Cardio: RRR, nml S1/S2  Resp: Good effort, CTA b/l  Thorax: No chest wall tenderness  Breast: No lesions/masses, no drainage  GI/Abd: Soft, NT/ND, no rebound/guarding, no masses palpated  Vascular: All 4 extremities warm.  Skin: Intact, no breakdown  Lymphatic/Nodes: No palpable lymphadenopathy  Pelvis: stable  Musculoskeletal: All 4 extremities moving spontaneously, no limitations, no spinal tenderness or stepoffs   (2021 20:22)    PAST MEDICAL & SURGICAL HISTORY:  Cancer of prostate with low recurrence risk (stage T1-2a and Rizwan &lt; 7 and PSA &lt; 10)  2010 treated with radiation    Prostate cancer      Sciatica  pt denies recent c/o    Gout    Hypercholesterolemia    HTN - Hypertension    Excision Cataract OS  2010    Robotic Assisted Prostatectomy  2008        SOCIAL HISTORY:    FAMILY HISTORY:  Family history of colon cancer in father (Father)        SOCIAL HISTORY:    Allergies    Allergy Status Unknown  No Known Allergies    Intolerances        MEDICATIONS  (STANDING):  sodium chloride 3%. 500 milliLiter(s) (30 mL/Hr) IV Continuous <Continuous>    MEDICATIONS  (PRN):      Vital Signs Last 24 Hrs  T(C): 36.6 (2021 15:50), Max: 37.1 (2021 00:45)  T(F): 97.8 (2021 15:50), Max: 98.8 (2021 00:45)  HR: 79 (2021 16:00) (68 - 87)  BP: 124/49 (2021 17:00) (99/68 - 149/67)  BP(mean): 71 (2021 17:00) (70 - 97)  RR: 15 (2021 16:00) (14 - 31)  SpO2: 83% (2021 17:00) (83% - 99%)    PHYSICAL EXAM:    GENERAL: NAD, well-groomed, well-developed  HEAD:  Atraumatic, Normocephalic  EYES: EOMI, PERRLA, conjunctiva and sclera clear  ENMT: No tonsillar erythema, exudates, or enlargement; Moist mucous membranes, Good dentition, No lesions  NECK: Supple, No JVD, Normal thyroid  NERVOUS SYSTEM:  Alert & Oriented X3, Good concentration; Motor Strength 5/5 B/L upper and lower extremities; DTRs 2+ intact and symmetric  CHEST/LUNG: Clear to percussion bilaterally; No rales, rhonchi, wheezing, or rubs  HEART: Regular rate and rhythm; No murmurs, rubs, or gallops  ABDOMEN: Soft, Nontender, Nondistended; Bowel sounds present  EXTREMITIES:  2+ Peripheral Pulses, No clubbing, cyanosis, or edema  LYMPH: No lymphadenopathy noted  SKIN: No rashes or lesions      LABS:                        7.4    2.29  )-----------( 121      ( 2021 12:50 )             21.5     01    126<L>  |  92<L>  |  15.0  ----------------------------<  100<H>  3.8   |  23.0  |  0.95    Ca    8.4<L>      2021 12:51  Phos  2.7       Mg     1.2         TPro  5.7<L>  /  Alb  3.2<L>  /  TBili  0.9  /  DBili  x   /  AST  42<H>  /  ALT  19  /  AlkPhos  134<H>      PT/INR - ( 2021 22:30 )   PT: 12.5 sec;   INR: 1.08 ratio         PTT - ( 2021 22:30 )  PTT:28.2 sec  Urinalysis Basic - ( 2021 04:29 )    Color: Yellow / Appearance: Clear / S.015 / pH: x  Gluc: x / Ketone: Moderate  / Bili: Negative / Urobili: Negative mg/dL   Blood: x / Protein: 30 mg/dL / Nitrite: Negative   Leuk Esterase: Trace / RBC: 0-2 /HPF / WBC 0-2   Sq Epi: x / Non Sq Epi: Occasional / Bacteria: x          RADIOLOGY & ADDITIONAL STUDIES:    PATHOLOGY:

## 2021-01-21 NOTE — CHART NOTE - NSCHARTNOTEFT_GEN_A_CORE
Spoke with family wife Annabel, and son Bienvenido [phone] regarding pt condition and plan of care for today. All questions answered

## 2021-01-21 NOTE — CONSULT NOTE ADULT - ASSESSMENT
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

## 2021-01-21 NOTE — CONSULT NOTE ADULT - SUBJECTIVE AND OBJECTIVE BOX
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 Transported to  [facility] after a Syncopal episode that led to LOC, after which he was then transferred to [facility] with Nontraumatic ICH. He has been kept NPO- since ICH will have a swallow eval soon. RR 21 POX 91-99%  He has been OOB/CH generalized weakness no unilateral weakness. I met him in SICU, alert and oriented x 3 denies HA/ Vision disturbance CP chronic pain SOB, dizziness. We talked about his history of Prostate Cancer, treated in past with RT, currently seeking chemotherapy treatment through [facility].  Due to start Chemotherapy yesterday-which has been postponed due to ICH  HPI:  80y Male BIB Air [facility] as transfer from [facility] after syncopal ground level fall with known right intraventricular hemorrhage without any other isolated injury    Reports he was walking with his walker and then fell likely syncopal vs mechanical, came to with wife trying to arouse him.  Reportedly 1-2min LOC.  Denies bowel or bladder dysfunction.       Pt was originally treated with 3% Nacl , now off   Urine lytes done on 3 % NaCl     Was on  Losartan / HCTZ PTA  Recent addition of Lexapro       PAST MEDICAL & SURGICAL HISTORY:  Cancer of prostate with low recurrence risk (stage T1-2a and [place] &lt; 7 and PSA &lt; 10)  2010 treated with radiation  Prostate cancer    Sciatica  Gout  Hypercholesterolemia  HTN - Hypertension  Excision Cataract OS  2010  Robotic Assisted Prostatectomy  2008  Lumbar Fusion     FAMILY HISTORY:  Family history of colon cancer in father (Father)    SOCIAL HISTORY:  Lives with wife    ALLERGIES: Allergy Status Unknown  No Known Allergies        Home Medications:   * Patient Currently Takes Medications as of 14-Mar-2018 19:41 documented in Structured Notes  · 	Keflex 500 mg oral capsule: 1 cap(s) orally 2 times a day   · 	losartan-hydroCHLOROthiazide 100 mg-25 mg oral tablet: 1 tab(s) orally once a day  · 	atenolol 50 mg oral tablet: 2 tab(s) orally once a day  · 	atenolol 50 mg oral tablet: 1 tab(s) orally once a day (at bedtime)  · 	pravastatin 20 mg oral tablet: 1 tab(s) orally once a day  · 	allopurinol 300 mg oral tablet: 1 tab(s) orally once a day  · 	Vitamin D3 1000 intl units oral capsule: 1 cap(s) orally once a day               Lexapro 10mg at bedtime for past 3 weeks  --------------------------------------------------------------------------------------------    PHYSICAL EXAM: ***  General: NAD, Lying in bed comfortably  Neuro: A+Ox3  HEENT: NC/AT, EOMI  Neck: Soft, supple  Cardio: RRR, nml S1/S2  Resp: Good effort, CTA b/l  Thorax: No chest wall tenderness  Breast: No lesions/masses, no drainage  GI/Abd: Soft, NT/ND, no rebound/guarding, no masses palpated  Vascular: All 4 extremities warm.  Skin: Intact, no breakdown  Lymphatic/Nodes: No palpable lymphadenopathy  Pelvis: stable  Musculoskeletal: All 4 extremities moving spontaneously, no limitations, no spinal tenderness or stepoffs   (2021 20:22)      PAST MEDICAL & SURGICAL HISTORY:  Cancer of prostate with low recurrence risk (stage T1-2a and Rizwan &lt; 7 and PSA &lt; 10)  2010 treated with radiation    Prostate cancer      Sciatica  pt denies recent c/o    Gout    Hypercholesterolemia    HTN - Hypertension    Excision Cataract OS  2010    Robotic Assisted Prostatectomy  2008        FAMILY HISTORY:  Family history of colon cancer in father (Father)        Social History:    MEDICATIONS  (STANDING):  sodium chloride 3%. 500 milliLiter(s) (30 mL/Hr) IV Continuous <Continuous>    MEDICATIONS  (PRN):      Allergies    Allergy Status Unknown  No Known Allergies    Intolerances        Vital Signs Last 24 Hrs  T(C): 36.6 (2021 15:50), Max: 37.1 (2021 00:45)  T(F): 97.8 (2021 15:50), Max: 98.8 (2021 00:45)  HR: 76 (2021 14:00) (68 - 87)  BP: 131/56 (2021 14:00) (99/68 - 149/67)  BP(mean): 79 (2021 14:00) (70 - 97)  RR: 19 (2021 14:00) (14 - 31)  SpO2: 94% (2021 14:00) (88% - 99%)  Daily Height in cm: 165.1 (2021 20:38)    Daily   I&O's Detail    2021 07:01  -  2021 07:00  --------------------------------------------------------  IN:    IV PiggyBack: 100 mL    sodium chloride 3%: 150 mL    sodium chloride 3%: 240 mL  Total IN: 490 mL    OUT:    Voided (mL): 225 mL  Total OUT: 225 mL    Total NET: 265 mL      2021 07:  -  2021 17:13  --------------------------------------------------------  IN:    sodium chloride 3%: 210 mL  Total IN: 210 mL    OUT:    Voided (mL): 900 mL  Total OUT: 900 mL    Total NET: -690 mL        I&O's Summary    :  -  2021 07:00  --------------------------------------------------------  IN: 490 mL / OUT: 225 mL / NET: 265 mL    :  -  2021 17:13  --------------------------------------------------------  IN: 210 mL / OUT: 900 mL / NET: -690 mL    General appearance: No acute distress, Awake, Alert  HEENT: Normocephalic, Atraumatic, Conjunctiva clear, EOMI  Neck: Supple, No JVD, No tenderness  Lungs: Breath sound equal bilaterally, No wheezes, No rales  Cardiovascular: S1S2, Regular rhythm, Right chemoport in place with dressing  Abdomen: Soft, Nontender, Nondistended, No guarding/rebound, Positive bowel sounds  Extremities: No clubbing, No cyanosis, No edema, No calf tenderness  Neuro: Strength equal bilaterally, No tremors  Psychiatric: Appropriate mood, Normal affect    LABS:                        7.4    2.29  )-----------( 121      ( 2021 12:50 )             21.5         126<L>  |  92<L>  |  15.0  ----------------------------<  100<H>  3.8   |  23.0  |  0.95    Ca    8.4<L>      2021 12:51  Phos  2.7       Mg     1.2         TPro  5.7<L>  /  Alb  3.2<L>  /  TBili  0.9  /  DBili  x   /  AST  42<H>  /  ALT  19  /  AlkPhos  134<H>      PT/INR - ( 2021 22:30 )   PT: 12.5 sec;   INR: 1.08 ratio         PTT - ( 2021 22:30 )  PTT:28.2 sec  Urinalysis Basic - ( 2021 04:29 )    Color: Yellow / Appearance: Clear / S.015 / pH: x  Gluc: x / Ketone: Moderate  / Bili: Negative / Urobili: Negative mg/dL   Blood: x / Protein: 30 mg/dL / Nitrite: Negative   Leuk Esterase: Trace / RBC: 0-2 /HPF / WBC 0-2   Sq Epi: x / Non Sq Epi: Occasional / Bacteria: x      Magnesium, Serum: 1.2 mg/dL ( @ 04:28)  Phosphorus Level, Serum: 2.7 mg/dL ( @ 04:28)          RADIOLOGY & ADDITIONAL TESTS:   Patient is a 80y old  Male who presents with a chief complaint of Right Intraventricular Hemorrhage (2021 16:12)      · Subjective and Objective:   This is an 79 yo M PMH of Prostate Cancer/Prostatectomy.80M with a history of gallbladder and prostate cancer with 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 Transported to  [facility] after a Syncopal episode that led to LOC, after which he was then transferred to [facility] with Nontraumatic ICH. He has been kept NPO- since ICH will have a swallow eval soon. RR 21 POX 91-99%  He has been OOB/CH generalized weakness no unilateral weakness. I met him in SICU, alert and oriented x 3 denies HA/ Vision disturbance CP chronic pain SOB, dizziness. We talked about his history of Prostate Cancer, treated in past with RT, currently seeking chemotherapy treatment through [facility].  Due to start Chemotherapy yesterday-which has been postponed due to ICH  HPI:  80y Male BIB Air [facility] as transfer from [facility] after syncopal ground level fall with known right intraventricular hemorrhage without any other isolated injury    Reports he was walking with his walker and then fell likely syncopal vs mechanical, came to with wife trying to arouse him.  Reportedly 1-2min LOC.  Denies bowel or bladder dysfunction.       Pt was originally treated with 3% Nacl , now off   Urine lytes done on 3 % NaCl   Pt tells me that he has metastatic GB cancer to the lung   He had CT scans and a Bx at [facility] in [facility]     Was on  Losartan / HCTZ PTA  Recent addition of Lexapro       PAST MEDICAL & SURGICAL HISTORY:  Cancer of prostate with low recurrence risk (stage T1-2a and [place] &lt; 7 and PSA &lt; 10)  2010 treated with radiation  Prostate cancer  2009  Sciatica  Gout  Hypercholesterolemia  HTN - Hypertension  Excision Cataract OS  2010  Robotic Assisted Prostatectomy  2008  Lumbar Fusion     FAMILY HISTORY:  Family history of colon cancer in father (Father)    SOCIAL HISTORY:  Lives with wife    ALLERGIES: Allergy Status Unknown  No Known Allergies        Home Medications:   * Patient Currently Takes Medications as of 14-Mar-2018 19:41 documented in Structured Notes  · 	Keflex 500 mg oral capsule: 1 cap(s) orally 2 times a day   · 	losartan-hydroCHLOROthiazide 100 mg-25 mg oral tablet: 1 tab(s) orally once a day  · 	atenolol 50 mg oral tablet: 2 tab(s) orally once a day  · 	atenolol 50 mg oral tablet: 1 tab(s) orally once a day (at bedtime)  · 	pravastatin 20 mg oral tablet: 1 tab(s) orally once a day  · 	allopurinol 300 mg oral tablet: 1 tab(s) orally once a day  · 	Vitamin D3 1000 intl units oral capsule: 1 cap(s) orally once a day               Lexapro 10mg at bedtime for past 3 weeks  --------------------------------------------------------------------------------------------    PHYSICAL EXAM: ***  General: NAD, Lying in bed comfortably  Neuro: A+Ox3  HEENT: NC/AT, EOMI  Neck: Soft, supple  Cardio: RRR, nml S1/S2  Resp: Good effort, CTA b/l  Thorax: No chest wall tenderness  Breast: No lesions/masses, no drainage  GI/Abd: Soft, NT/ND, no rebound/guarding, no masses palpated  Vascular: All 4 extremities warm.  Skin: Intact, no breakdown  Lymphatic/Nodes: No palpable lymphadenopathy  Pelvis: stable  Musculoskeletal: All 4 extremities moving spontaneously, no limitations, no spinal tenderness or stepoffs   (2021 20:22)      PAST MEDICAL & SURGICAL HISTORY:  Cancer of prostate with low recurrence risk (stage T1-2a and Rizwan &lt; 7 and PSA &lt; 10)  2010 treated with radiation    Prostate cancer  2009    Sciatica  pt denies recent c/o    Gout    Hypercholesterolemia    HTN - Hypertension    Excision Cataract OS  2010    Robotic Assisted Prostatectomy  2008        FAMILY HISTORY:  Family history of colon cancer in father (Father)        Social History:    MEDICATIONS  (STANDING):  sodium chloride 3%. 500 milliLiter(s) (30 mL/Hr) IV Continuous <Continuous>    MEDICATIONS  (PRN):      Allergies    Allergy Status Unknown  No Known Allergies    Intolerances        Vital Signs Last 24 Hrs  T(C): 36.6 (2021 15:50), Max: 37.1 (2021 00:45)  T(F): 97.8 (2021 15:50), Max: 98.8 (2021 00:45)  HR: 76 (2021 14:00) (68 - 87)  BP: 131/56 (2021 14:00) (99/68 - 149/67)  BP(mean): 79 (2021 14:00) (70 - 97)  RR: 19 (2021 14:00) (14 - 31)  SpO2: 94% (2021 14:00) (88% - 99%)  Daily Height in cm: 165.1 (2021 20:38)    Daily   I&O's Detail    2021 07:01  -  2021 07:00  --------------------------------------------------------  IN:    IV PiggyBack: 100 mL    sodium chloride 3%: 150 mL    sodium chloride 3%: 240 mL  Total IN: 490 mL    OUT:    Voided (mL): 225 mL  Total OUT: 225 mL    Total NET: 265 mL      2021 07:01  -  2021 17:13  --------------------------------------------------------  IN:    sodium chloride 3%: 210 mL  Total IN: 210 mL    OUT:    Voided (mL): 900 mL  Total OUT: 900 mL    Total NET: -690 mL        I&O's Summary    2021 07:01  -  2021 07:00  --------------------------------------------------------  IN: 490 mL / OUT: 225 mL / NET: 265 mL    2021 07:01  -  2021 17:13  --------------------------------------------------------  IN: 210 mL / OUT: 900 mL / NET: -690 mL    General appearance: No acute distress, Awake, Alert  HEENT: Normocephalic, Atraumatic, Conjunctiva clear, EOMI  Neck: Supple, No JVD, No tenderness  Lungs: Breath sound equal bilaterally, No wheezes, No rales  Cardiovascular: S1S2, Regular rhythm, Right chemoport in place with dressing  Abdomen: Soft, Nontender, Nondistended, No guarding/rebound, Positive bowel sounds  Extremities: No clubbing, No cyanosis, No edema, No calf tenderness  Neuro: Strength equal bilaterally, No tremors  Psychiatric: Appropriate mood, Normal affect    LABS:                        7.4    2.29  )-----------( 121      ( 2021 12:50 )             21.5         126<L>  |  92<L>  |  15.0  ----------------------------<  100<H>  3.8   |  23.0  |  0.95    Ca    8.4<L>      2021 12:51  Phos  2.7       Mg     1.2         TPro  5.7<L>  /  Alb  3.2<L>  /  TBili  0.9  /  DBili  x   /  AST  42<H>  /  ALT  19  /  AlkPhos  134<H>      PT/INR - ( 2021 22:30 )   PT: 12.5 sec;   INR: 1.08 ratio         PTT - ( 2021 22:30 )  PTT:28.2 sec  Urinalysis Basic - ( 2021 04:29 )    Color: Yellow / Appearance: Clear / S.015 / pH: x  Gluc: x / Ketone: Moderate  / Bili: Negative / Urobili: Negative mg/dL   Blood: x / Protein: 30 mg/dL / Nitrite: Negative   Leuk Esterase: Trace / RBC: 0-2 /HPF / WBC 0-2   Sq Epi: x / Non Sq Epi: Occasional / Bacteria: x      Magnesium, Serum: 1.2 mg/dL ( @ 04:28)  Phosphorus Level, Serum: 2.7 mg/dL ( @ 04:28)      < from: MR Head w/wo IV Cont (21 @ 15:33) >     EXAM:  MR BRAIN WAW IC                          PROCEDURE DATE:  2021          INTERPRETATION:  CLINICAL INDICATION: Right intraventricular hemorrhage status post fall. Prostate cancer status post resection and radiation therapy. Metastases to spine. Lung mass.    TECHNIQUE: Multi-planar multi-sequential MR imaging of the brain was performed before and after the intravenous administration of contrast. 9 ml of Gadavist IV contrast was administered.    COMPARISON: CT head 2021.    FINDINGS:    Redemonstrated intraventricular hemorrhage in the right lateral ventricle layering within the right occipital horn as well as adherent to the choroid plexus. Trace hemorrhage also noted in the left occipital horn.    Punctate foci of enhancement with associated FLAIR hyperintensity involving the medial left precentral gyrus as well as the left inferior occipital lobe.    Areas of encephalomalacia/gliosis involving the bilateral orbitofrontal lobes and right temporal pole, compatible withprior traumatic contusions.     No hydrocephalus. The skull base flow voids are present.    Bilateral cataract surgery. There is a 1.0 x 0.9 cm lesion in the left orbit, appearing intrinsic to the superior rectus muscle. Associated restricted diffusion is noted. There is likely at least mild enhancement although evaluation is limited due to extensive motion.     The imaged portions of the paranasal sinuses are clear. The mastoid air cells are clear. The visualized osseous structures, soft tissuesand partially visualized parotid glands appear normal.    IMPRESSION:  -Redemonstrated intraventricular hemorrhage. No gross new hemorrhage identified.    -Punctate foci of enhancement and associated FLAIR hyperintense signal involving the medial left precentral gyrus and left inferior occipital lobe. Differential includes metastases versus subacute infarcts.    -1 cm intraorbital lesion intrinsic to the left superior rectus muscle with associated restricted diffusion. Differential includes metastasis versus lymphoma.            JEANNINE CLARK M.D., ATTENDING RADIOLOGIST  This document has been electronically signed. 2021  4:16PM    < end of copied text >      < from: Xray Chest 1 View AP/PA. (21 @ 20:42) >     EXAM:  XR CHEST AP OR PA 1V                          PROCEDURE DATE:  2021          INTERPRETATION:  Chest portable.    Clinical History: Trauma code.    Comparison: 2021.    Single AP view submitted.    Findings/  impression:    Patient is mildly rotated.    Right MediPort catheter is unchanged in position.    The evaluation of the cardiomediastinal silhouette is limited on portable technique.  There is a prominent cardiac silhouette.    Again noted is masslike consolidation right midlung zone.  There is prominence of the central pulmonary vasculature.  There is minimal blunting at the right costophrenic angle which may reflect trace pleural effusion and/or pleural thickening.    No pneumothorax is noted.    There is a probableold fracture deformity left sixth posterior lateral rib.  Spinal instrumentation is present at the lower thoracic levels.                    NICK QIUNN MD; Attending Radiologist  This document has been electronically signed. 2021  9:05AM    < end of copied text >  RADIOLOGY & ADDITIONAL TESTS:

## 2021-01-21 NOTE — CONSULT NOTE ADULT - ASSESSMENT
80M h/o former chronic smoker (quit >20 yrs ago), follows with outside cardiologist (Dr. Ellington in [place], on ASA 81mg for primary prevention), HTN, HLD, prostate cancer (s/p resection/radiation), recently diagnosed metastatic cancer suspected of gallbladder origin (initially dx as mets to spine s/p resection 11/2020, chest, liver, adrenal) [place] [facility] [place] (Dr. Musa) was planning to start chemotherapy yesterday but then noted to be hyponatremic, first chemo course deferred and given IVF hydration, stopped HCTZ, then returned home around 5pm yesterday while in was standing in the kitchen noted weakness in his legs "just gave out" then fell backward hit his head, reportedly was awake prior the fall, then loss consciousness after he hit the ground, wife found him and went to [facility] found with R-interventricular hemorrhage, transferred here overnight for NSICU.     Fall/syncope suspect as the cause for the cerebral bleed on ASA 81mg, no prodromal cardiac symptoms, baseline EKG and overnight telemetry without arrhythmic event, suspect due to dehydration/hyponatremia for the fall/syncope.       1. Fall/syncope  -continue telemetry monitoring  -TTE pending  -hold home meds atenolol and losartan  -check orthostatic BP when able  -carotid duplex  -consider Event monitor as outpatient if no significant telemetry event, likely not need ILR implant as he/family not interested in    2. R-interventricular bleed  -traumatic related on ASA 81?  -continue neurosurgery management, no indication for antiplatelet use     3. Hyponatremia  -suspect SIADH vs. HCTZ use  -check urine electrolytes osmolality  -on hypertonic saline- trend Na level closely     4. HTN  currently normotensive off home meds    5. Metastatic cancer suspected gallbladder etiology  -not yet on chemotherapy  -await records from [facility] and collaterals with his oncologist Dr. Musa        Discussed with his son over the phone.       Meng Guajardo DO, [facility]  Faculty Non-Invasive Cardiologist  [phone]

## 2021-01-21 NOTE — PHYSICAL THERAPY INITIAL EVALUATION ADULT - CRITERIA FOR SKILLED THERAPEUTIC INTERVENTIONS
Home with Home PT, least restrictive device/impairments found/anticipated equipment needs at discharge/anticipated discharge recommendation

## 2021-01-21 NOTE — SBIRT NOTE ADULT - NSSBIRTALCPOSREINDET_GEN_A_CORE
Pt states he does not have any issues w/ ETOH abuse. He admits that he will drink a glass of scotch and a glass of wine daily w/ dinner. He denies any substance abuse problem. He denies need for substance abuse resources.

## 2021-01-21 NOTE — PHYSICAL THERAPY INITIAL EVALUATION ADULT - ADDITIONAL COMMENTS
Pt lives in a private home with his wife. 5 steps to enter with handrails, no steps inside. Pt was independent PTA with [facility]. Pt owns RW and [facility] only.

## 2021-01-21 NOTE — PROGRESS NOTE ADULT - SUBJECTIVE AND OBJECTIVE BOX
HPI: 80M s/p syncope fall transfer from [facility] [place] with right IVH     24 hr events: repeat CT head stable. Na 117 started on 3% NS.     Vital Signs Last 24 Hrs  T(C): 36.7 (20 Jan 2021 22:30), Max: 36.7 (20 Jan 2021 22:30)  T(F): 98 (20 Jan 2021 22:30), Max: 98 (20 Jan 2021 22:30)  HR: 76 (21 Jan 2021 01:00) (76 - 80)  BP: 128/56 (21 Jan 2021 01:00) (128/56 - 149/67)  BP(mean): 78 (21 Jan 2021 01:00) (78 - 97)  RR: 17 (21 Jan 2021 01:00) (17 - 21)  SpO2: 95% (21 Jan 2021 01:00) (95% - 99%)    Physical Exam:     Constitutional: WN/WD in NAD  	Neuro  	* Mental Status:  GCS 15: E4, V5, M6. Awake, alert, oriented to conversation. No expressive aphasia or word finding difficulty. Able to name objects & their function.   	* Cranial Nerves: Cnii-Cnxii grossly intact. PERRL, EOMI, tongue midline, no gaze deviation. No visual field cut  	* Motor: RUE 5/5, LUE 5/5, RLE 5/5, LLE 5/5, no drift or dysmetria  	* Sensory: Sensation intact to light touch  	* Reflexes: Not assessed  	* Gait: Not assessed    	 HPI: 80M s/p syncope fall transfer [place] [facility] [place]H     24 hr events: repeat CT head stable. Na 117 started on 3% NS, repeat of 125/126, stabilizing. MRI w/wo performed.     Vital Signs Last 24 Hrs  T(C): 36.7 (20 Jan 2021 22:30), Max: 36.7 (20 Jan 2021 22:30)  T(F): 98 (20 Jan 2021 22:30), Max: 98 (20 Jan 2021 22:30)  HR: 76 (21 Jan 2021 01:00) (76 - 80)  BP: 128/56 (21 Jan 2021 01:00) (128/56 - 149/67)  BP(mean): 78 (21 Jan 2021 01:00) (78 - 97)  RR: 17 (21 Jan 2021 01:00) (17 - 21)  SpO2: 95% (21 Jan 2021 01:00) (95% - 99%)    Physical Exam:     Constitutional: WN/WD in NAD  	Neuro  	* Mental Status:  GCS 15: E4, V5, M6. Awake, alert, oriented to conversation. No expressive aphasia or word finding difficulty. Able to name objects & their function.   	* Cranial Nerves: Cnii-Cnxii grossly intact. PERRL, EOMI, tongue midline, no gaze deviation. No visual field cut  	* Motor: RUE 5/5, LUE 5/5, RLE 5/5, LLE 5/5, no drift or dysmetria  	* Sensory: Sensation intact to light touch  	* Reflexes: Not assessed  	* Gait: Not assessed  Cardiovascular:  S1, S2 no murmurs appreciated.  Regular rate and rhythm.  Eyes: See neurologic examination with detailed examination of eyes.  ENT: No JVD, Trachea Midline.  Respiratory: Clear to auscultation.  Gastrointestinal: Soft, nontender, nondistended.  Genitourinary: no rockwell  Musculoskeletal: No muscle wasting noted, (See neurologic assessment for full muscle strength assessment) No pretibial edema appreciated, no appreciable calf tenderness.  Skin:  no skin breakdown  Musculoskeletal: See detailed muscle strength examination, listed under neurologic examination.  Hematologic / Lymph / Immunologic: No bleeding from IV sites or wounds, No lymphadenopathy, No Hives or allergic type skin lesions    I&O's Summary    20 Jan 2021 07:01  -  21 Jan 2021 07:00  --------------------------------------------------------  IN: 490 mL / OUT: 225 mL / NET: 265 mL    21 Jan 2021 07:01  -  21 Jan 2021 16:10  --------------------------------------------------------  IN: 210 mL / OUT: 900 mL / NET: -690 mL      Labs:              7.4    2.29  )-----------( 121      ( 21 Jan 2021 12:50 )             21.5   01-21    126<L>  |  92<L>  |  15.0  ----------------------------<  100<H>  3.8   |  23.0  |  0.95    Ca    8.4<L>      21 Jan 2021 12:51  Phos  2.7     01-21  Mg     1.2     01-21    TPro  5.7<L>  /  Alb  3.2<L>  /  TBili  0.9  /  DBili  x   /  AST  42<H>  /  ALT  19  /  AlkPhos  134<H>  01-20      Radiology:   < from: CT Head No Cont (01.21.21 @ 04:49) >  IMPRESSION:  Small focus of hemorrhage in the right lateral ventricle is unchanged. No new or enlarging hemorrhage.    1.3 cm nodule in the left orbit is again seen, may represent a small hemangioma; may be further evaluated with MRI.            KHARI CORRIGAN MD; Attending Radiologist  This document has been electronically signed. Jan 21 2021  5:07AM    < end of copied text >

## 2021-01-21 NOTE — PROGRESS NOTE ADULT - ASSESSMENT
80M s/p syncope fall with small right IVH     Plan   - q1 neuro checks   - Repeat CT in am   - -140   - Okay for diet   - correct Na to normal range   - SCDs only for DVT ppx  80M s/p syncope fall with small right IVH. CT scan stable. Hyponatremia improving. MRI w/wo performed, concern for leptomeningeal spread of cancer.     Plan   - q1 neuro checks   - Recommend potential LP due to potential leptomeningeal spread  - Recommend rad/onc consult  - -140   - Okay for diet   - correct Na to normal range   - SCDs only for DVT ppx   - Repeat MRI in 4-6 weeks  - Follow-up as outpatient with Dr. Castro in 1 month  - Will continue to follow

## 2021-01-21 NOTE — CONSULT NOTE ADULT - ASSESSMENT
81 yo M S/P Fall after syncopal episode with Nontraumatic ICH    Nontraumatic R lateral Ventricular hemorrhage  CT head results see above  Neuro signs as per protocol  Minimal Neuro deficits assessed at this time  MRI with contrast ordered later today  PT/OT Eval    Syncopy work-up  Echocariogram see above  EKG Telemetry    Dysphagia-NPO until cleared by speech/swallow  Anemia-H/H 7.1-20.4 follow trends    Hyponatremia  NA level increasing with 3% @ 60/hr  126 last BMP  Urinalysis/Lytes trending  Holding Losartan    Prostate Cancer with Disease Progression  [facility] oncology Providers  New Lung Metastasis  Planning to start chemotherapy yesterday-now postponed due to Neurological event  Scheduled for MRI of Brain with Contrast to R/O Brain Mets    GOC  Neurology following  Stroke protocol   To treat recurrent cancer

## 2021-01-21 NOTE — CONSULT NOTE ADULT - SUBJECTIVE AND OBJECTIVE BOX
This is an 79 yo M PMH of Prostate Cancer/Prostatectomy with recent 2021 cancer recurrence-and New Lung Mass. Transported to  [facility] after a Syncopal episode that led to LOC, after which he was then transferred to [facility] with Nontraumatic ICH. He has been kept NPO- since ICH will have a swallow eval soon. RR 21 POX 91-99%  He has been OOB/CH generalized weakness no unilateral weakness. I met him in SICU, alert and oriented x 3 denies HA/ Vision disturbance CP chronic pain SOB, dizziness. We talked about his history of Prostate Cancer, treated in past with RT, currently seeking chemotherapy treatment through [facility].  Due to start Chemotherapy yesterday-which has been postponed due to ICH  HPI:  80y Male BIB [facility] after syncopal ground level fall with known right intraventricular hemorrhage without any other isolated injury    Reports he was walking with his walker and then fell likely syncopal vs mechanical, came to with wife trying to arouse him.  Reportedly 1-2min LOC.  Denies bowel or bladder dysfunction.       Patient denies fevers/chills, denies lightheadedness/dizziness, denies SOB/chest pain, denies nausea/vomiting, denies constipation/diarrhea.     A airway intact, C collar previously cleared [facility],  speaking full sentences  B equal breath sounds bilaterally  C radial/DP/femoral pulses intact bilaterally   D GCS15 [id number], moving all fours, no focal deficits, pupils R 3mm reactive/L 3mm reactive  E patient fully exposed, no gross deformities or bleeding, provided warm blankets    CXR no fracture or hemopneumothorax  FAST negative    PAST MEDICAL & SURGICAL HISTORY:  Cancer of prostate with low recurrence risk (stage T1-2a and Rizwan &lt; 7 and PSA &lt; 10)  2010 treated with radiation  Prostate cancer  2009  Sciatica  Gout  Hypercholesterolemia  HTN - Hypertension  Excision Cataract OS  2010  Robotic Assisted Prostatectomy  2008  Lumbar Fusion     FAMILY HISTORY:  Family history of colon cancer in father (Father)    SOCIAL HISTORY:  Lives with wife    No Known Allergies    PERTINENT PMH REVIEWED: Yes           SOCIAL HISTORY:  EtOH Yes   No                                    Drugs  Yes  No                                    smoker  nonsmoker                                    Admitted from: home  SNF _________ MALISSA ________Surrogate/HCP/Guardian: Phone#:    FAMILY HISTORY:  Family history of colon cancer in father (Father)      Father:  Mother:   No family history related to admission diagnosis    Baseline ADLs (prior to admission):  Independent/t      Present Symptoms:   Dyspnea: 0   Nausea/Vomiting:No  Anxiety:  Yes   Depression: ?  Fatigue: Yes   Loss of appetite: NPO is thirsty    Pain: no headache or chronic pain            Character-            Duration-            Effect-            Factors-            Frequency-            Location-            Severity-    Review of Systems: Reviewed              All others negative    MEDICATIONS  (STANDING):  potassium chloride    Tablet ER 20 milliEquivalent(s) Oral once  sodium chloride 3%. 500 milliLiter(s) (30 mL/Hr) IV Continuous <Continuous>    PHYSICAL EXAM:    Vital Signs Last 24 Hrs  T(C): 36.9 (2021 12:00), Max: 37.1 (2021 00:45)  T(F): 98.5 (2021 12:00), Max: 98.8 (2021 00:45)  HR: 77 (2021 12:00) (68 - 87)  BP: 136/60 (2021 12:00) (99/68 - 149/67)  BP(mean): 82 (2021 12:00) (70 - 97)  RR: 20 (2021 12:00) (14 - 31)  SpO2: 94% (2021 12:00) (88% - 99%)    General: alert  oriented x _3___ awake, engaging following commands                 obese     HEENT:   dry mouth     Lungs: comfortable     CV: normal      GI: distended               constipation  last BM:     :  luli    [facility]:  weakness              bedbound-OOB/CH    Skin:   no rash    LABS:                        7.4    2.29  )-----------( 121      ( 2021 12:50 )             21.5     01-    126<L>  |  92<L>  |  15.0  ----------------------------<  100<H>  3.8   |  23.0  |  0.95    Ca    8.4<L>      2021 12:51  Phos  2.7       Mg     1.2         TPro  5.7<L>  /  Alb  3.2<L>  /  TBili  0.9  /  DBili  x   /  AST  42<H>  /  ALT  19  /  AlkPhos  134<H>      PT/INR - ( 2021 22:30 )   PT: 12.5 sec;   INR: 1.08 ratio       PTT - ( 2021 22:30 )  PTT:28.2 sec    Urinalysis Basic - ( 2021 04:29 )  Color: Yellow / Appearance: Clear / S.015 / pH: x  Gluc: x / Ketone: Moderate  / Bili: Negative / Urobili: Negative mg/dL   Blood: x / Protein: 30 mg/dL / Nitrite: Negative   Leuk Esterase: Trace / RBC: 0-2 /HPF / WBC 0-2   Sq Epi: x / Non Sq Epi: Occasional / Bacteria: x      I&O's Summary    2021 07:  -  2021 07:00  --------------------------------------------------------  IN: 490 mL / OUT: 225 mL / NET: 265 mL    2021 07:01  -  2021 14:13  --------------------------------------------------------  IN: 150 mL / OUT: 500 mL / NET: -350 mL    RADIOLOGY & ADDITIONAL STUDIES:  < from: CT Head No Cont (21 @ 04:49) >  MPRESSION:  Small focus of hemorrhage in the right lateral ventricle is unchanged. No new or enlarging hemorrhage.    1.3 cm nodule in the left orbit is again seen, may represent a small hemangioma; may be further evaluated with MRI.      < end of copied text >    ADVANCE DIRECTIVES:   DNR  NO  Completed on:                     MOLST   NO   Completed on:  Living Will  YES NO   Completed on:

## 2021-01-21 NOTE — PROGRESS NOTE ADULT - SUBJECTIVE AND OBJECTIVE BOX
SUMMARY:HPI:  80y Male BIB [facility] as [place] [facility] [place] syncopal ground level fall with known right intraventricular hemorrhage without any other isolated injury    Reports he was walking with his walker and then fell likely syncopal vs mechanical, came to with wife trying to arouse him.  Reportedly 1-2min LOC.  Denies bowel or bladder dysfunction.       Patient denies fevers/chills, denies lightheadedness/dizziness, denies SOB/chest pain, denies nausea/vomiting, denies constipation/diarrhea.      PAST MEDICAL & SURGICAL HISTORY:  Prostate Ca with low recurrence risk (stage T1-2a and Husser &lt; 7 and PSA &lt; 10)  2010 treated with radiation/ Robotic Assisted prostatectomy  Sciatica  Gout  Hypercholesterolemia  HTN - Hypertension  Excision Cataract OS    9/27/2008  Lumbar Fusion     FAMILY HISTORY:  Family history of colon cancer in father (Father)    SOCIAL HISTORY:  Lives with wife    ALLERGIES: Allergy Status Unknown  No Known Allergies      OVERNIGHT EVENTS:  None    ADMISSION SCORES:   ICH - 2.0  NIHSS- 0    Allergies    Allergy Status Unknown  No Known Allergies    Intolerances    REVIEW OF SYSTEMS:   [X ] All ROS addressed below are non-contributory, except:  Neuro: [X ] Headache [ ] Back pain [ ] Numbness [ ] Weakness [ ] Ataxia [ ] Dizziness [ ] Aphasia [ ] Dysarthria [ ] Visual disturbance  Resp: [ ] Shortness of breath/dyspnea, [ ] Orthopnea [ ] Cough  CV: [ ] Chest pain [ ] Palpitation [ ] Lightheadedness [ ] Syncope  Renal: [ ] Thirst [ ] Edema  GI: [ ] Nausea [ ] Emesis [ ] Abdominal pain [ ] Constipation [ ] Diarrhea  Hem: [ ] Hematemesis [ ] bright red blood per rectum  ID: [ ] Fever [ ] Chills [ ] Dysuria  ENT: [ ] Rhinorrhea      VITALS: [X ] Reviewed  Vital Signs Last 24 Hrs  T(C): 36.4 (21 Jan 2021 07:27), Max: 37.1 (21 Jan 2021 00:45)  T(F): 97.6 (21 Jan 2021 07:27), Max: 98.8 (21 Jan 2021 00:45)  HR: 87 (21 Jan 2021 10:00) (68 - 87)  BP: 99/68 (21 Jan 2021 10:00) (99/68 - 149/67)  BP(mean): 79 (21 Jan 2021 10:00) (70 - 97)  RR: 20 (21 Jan 2021 10:00) (14 - 31)  SpO2: 88% (21 Jan 2021 10:00) (88% - 99%)  CAPILLARY BLOOD GLUCOSE            LABS:                          7.1    2.04  )-----------( 111      ( 21 Jan 2021 04:28 )             20.4     PT/INR - ( 20 Jan 2021 22:30 )   PT: 12.5 sec;   INR: 1.08 ratio         PTT - ( 20 Jan 2021 22:30 )  PTT:28.2 sec  01-21    125<L>  |  91<L>  |  16.0  ----------------------------<  91  3.7   |  24.0  |  1.00    Ca    8.0<L>      21 Jan 2021 07:16  Phos  2.7     01-21  Mg     1.2     01-21    TPro  5.7<L>  /  Alb  3.2<L>  /  TBili  0.9  /  DBili  x   /  AST  42<H>  /  ALT  19  /  AlkPhos  134<H>  01-20                          7.1    2.04  )-----------( 111      ( 21 Jan 2021 04:28 )             20.4       STROKE CORE MEASURES:      MEDICATION LEVELS:     IMAGING/DATA: [ ] Reviewed    IVF FLUIDS/MEDICATIONS: [ ] Reviewed  MEDICATIONS  (STANDING):  sodium chloride 3%. 500 milliLiter(s) (30 mL/Hr) IV Continuous <Continuous>  sodium chloride 3%. 500 milliLiter(s) (30 mL/Hr) IV Continuous <Continuous>    MEDICATIONS  (PRN):    I&O's Summary    20 Jan 2021 07:01  -  21 Jan 2021 07:00  --------------------------------------------------------  IN: 490 mL / OUT: 225 mL / NET: 265 mL    21 Jan 2021 07:01  -  21 Jan 2021 10:26  --------------------------------------------------------  IN: 120 mL / OUT: 400 mL / NET: -280 mL     CT Head No Cont (01.21.21 @ 04:49) >  INTERPRETATION:  CT HEAD WITHOUT CONTRAST    INDICATION: 80 years old. Male. f/u IVH syncope.    COMPARISON: 1/20/2021.    TECHNIQUE: Noncontrast axial CT head was obtained from the skull base to vertex.    FINDINGS:  Small focus of hemorrhage in the right lateral ventricle atria is unchanged. No new or enlarging hemorrhage. No hydrocephalus.    No mass effect or midline shift.  No CT evidence of acute large vascularterritory infarct.  The ventricles and cortical sulci are prominent reflecting parenchymal volume loss.  Scattered hypodensities in the periventricular white matter are nonspecific, but likely sequela of small vessel ischemic disease.    The visualized paranasal sinuses are well aerated. Trace bilateral mastoid effusions. The native ocular lenses are surgically absent.  1.3 cm nodule in the left orbit is again seen, may represent a small hemangioma; may be further evaluated with MRI. No displacedcalvarial fracture.    IMPRESSION:  Small focus of hemorrhage in the right lateral ventricle is unchanged. No new or enlarging hemorrhage.    1.3 cm nodule in the left orbit is again seen, may represent a small hemangioma; may be further evaluated with MRI.    < end of copied text >      EXAMINATION:  PHYSICAL EXAM:    Constitutional: No Acute Distress     Neurological: Awake, alert oriented to person, place and time, Following Commands, PERRL, EOMI, No Gaze Preference, Face Symmetrical, Speech Fluent, No dysmetria, No ataxia, No nystagmus     Motor exam:          Upper extremity                         Delt     Bicep     Tricep    HG                                                 R         5/5        5/5        5/5       5/5                                               L          5/5        5/5        5/5       5/5          Lower extremity                        HF         KF        KE       DF         PF                                                  R        5/5        5/5        5/5       5/5         5/5                                               L         5/5        5/5       5/5       5/5          5/5                                                 Sensation: [ ] intact to light touch  [ ] decreased:     Reflexes: Deep Tendon Reflexes Intact     Pulmonary: Clear to Auscultation, No rales, No rhonchi, No wheezes     Cardiovascular: S1, S2, Regular rate and rhythm     Gastrointestinal: Soft, Non-tender, Non-distended     Extremities: No calf tenderness     Incision:    SUMMARY:HPI:  80y Male BIB Air [facility] [place] [facility] [place] level fall with known right intraventricular hemorrhage without any other isolated injury    Reports he was walking with his walker and then fell likely syncopal vs mechanical, came to with wife trying to arouse him.  Reportedly 1-2min LOC.  Denies bowel or bladder dysfunction.       Patient denies fevers/chills, denies lightheadedness/dizziness, denies SOB/chest pain, denies nausea/vomiting, denies constipation/diarrhea.      PAST MEDICAL & SURGICAL HISTORY:  Metastatic Gallbladder Ca  Prostate Ca with low recurrence risk (stage T1-2a and Rizwan &lt; 7 and PSA &lt; 10)  2010 treated with radiation/ Robotic Assisted prostatectomy  Sciatica  Gout  Hypercholesterolemia  HTN - Hypertension  Excision Cataract OS    9/27/2008  Lumbar Fusion     FAMILY HISTORY:  Family history of colon cancer in father (Father)    SOCIAL HISTORY:  Lives with wife    ALLERGIES: Allergy Status Unknown  No Known Allergies      OVERNIGHT EVENTS:  None    ADMISSION SCORES:   ICH - 2.0  NIHSS- 0    Allergies    Allergy Status Unknown  No Known Allergies    Intolerances    REVIEW OF SYSTEMS:   [X ] All ROS addressed below are non-contributory, except:  Neuro: [X ] Headache [ ] Back pain [ ] Numbness [ ] Weakness [ ] Ataxia [ ] Dizziness [ ] Aphasia [ ] Dysarthria [ ] Visual disturbance  Resp: [ ] Shortness of breath/dyspnea, [ ] Orthopnea [ ] Cough  CV: [ ] Chest pain [ ] Palpitation [ ] Lightheadedness [ ] Syncope  Renal: [ ] Thirst [ ] Edema  GI: [ ] Nausea [ ] Emesis [ ] Abdominal pain [ ] Constipation [ ] Diarrhea  Hem: [ ] Hematemesis [ ] bright red blood per rectum  ID: [ ] Fever [ ] Chills [ ] Dysuria  ENT: [ ] Rhinorrhea      VITALS: [X ] Reviewed  Vital Signs Last 24 Hrs  T(C): 36.4 (21 Jan 2021 07:27), Max: 37.1 (21 Jan 2021 00:45)  T(F): 97.6 (21 Jan 2021 07:27), Max: 98.8 (21 Jan 2021 00:45)  HR: 87 (21 Jan 2021 10:00) (68 - 87)  BP: 99/68 (21 Jan 2021 10:00) (99/68 - 149/67)  BP(mean): 79 (21 Jan 2021 10:00) (70 - 97)  RR: 20 (21 Jan 2021 10:00) (14 - 31)  SpO2: 88% (21 Jan 2021 10:00) (88% - 99%)  CAPILLARY BLOOD GLUCOSE            LABS:                          7.1    2.04  )-----------( 111      ( 21 Jan 2021 04:28 )             20.4     PT/INR - ( 20 Jan 2021 22:30 )   PT: 12.5 sec;   INR: 1.08 ratio         PTT - ( 20 Jan 2021 22:30 )  PTT:28.2 sec  01-21    125<L>  |  91<L>  |  16.0  ----------------------------<  91  3.7   |  24.0  |  1.00    Ca    8.0<L>      21 Jan 2021 07:16  Phos  2.7     01-21  Mg     1.2     01-21    TPro  5.7<L>  /  Alb  3.2<L>  /  TBili  0.9  /  DBili  x   /  AST  42<H>  /  ALT  19  /  AlkPhos  134<H>  01-20                          7.1    2.04  )-----------( 111      ( 21 Jan 2021 04:28 )             20.4       STROKE CORE MEASURES:      MEDICATION LEVELS:     IMAGING/DATA: [ ] Reviewed    IVF FLUIDS/MEDICATIONS: [ ] Reviewed  MEDICATIONS  (STANDING):  sodium chloride 3%. 500 milliLiter(s) (30 mL/Hr) IV Continuous <Continuous>  sodium chloride 3%. 500 milliLiter(s) (30 mL/Hr) IV Continuous <Continuous>    MEDICATIONS  (PRN):    I&O's Summary    20 Jan 2021 07:01  -  21 Jan 2021 07:00  --------------------------------------------------------  IN: 490 mL / OUT: 225 mL / NET: 265 mL    21 Jan 2021 07:01  -  21 Jan 2021 10:26  --------------------------------------------------------  IN: 120 mL / OUT: 400 mL / NET: -280 mL     CT Head No Cont (01.21.21 @ 04:49) >  INTERPRETATION:  CT HEAD WITHOUT CONTRAST    INDICATION: 80 years old. Male. f/u IVH syncope.    COMPARISON: 1/20/2021.    TECHNIQUE: Noncontrast axial CT head was obtained from the skull base to vertex.    FINDINGS:  Small focus of hemorrhage in the right lateral ventricle atria is unchanged. No new or enlarging hemorrhage. No hydrocephalus.    No mass effect or midline shift.  No CT evidence of acute large vascularterritory infarct.  The ventricles and cortical sulci are prominent reflecting parenchymal volume loss.  Scattered hypodensities in the periventricular white matter are nonspecific, but likely sequela of small vessel ischemic disease.    The visualized paranasal sinuses are well aerated. Trace bilateral mastoid effusions. The native ocular lenses are surgically absent.  1.3 cm nodule in the left orbit is again seen, may represent a small hemangioma; may be further evaluated with MRI. No displacedcalvarial fracture.    IMPRESSION:  Small focus of hemorrhage in the right lateral ventricle is unchanged. No new or enlarging hemorrhage.    1.3 cm nodule in the left orbit is again seen, may represent a small hemangioma; may be further evaluated with MRI.    < end of copied text >      EXAMINATION:  PHYSICAL EXAM:    Constitutional: No Acute Distress     Neurological: Awake, alert oriented to person, place and time, Following Commands, PERRL, EOMI, No Gaze Preference, Face Symmetrical, Speech Fluent, No dysmetria, No ataxia, No nystagmus     Motor exam:          Upper extremity                         Delt     Bicep     Tricep    HG                                                 R         5/5        5/5        5/5       5/5                                               L          5/5        5/5        5/5       5/5          Lower extremity                        HF         KF        KE       DF         PF                                                  R        5/5        5/5        5/5       5/5         5/5                                               L         5/5        5/5       5/5       5/5          5/5                                                 Sensation: [ ] intact to light touch  [ ] decreased:     Reflexes: Deep Tendon Reflexes Intact     Pulmonary: Clear to Auscultation, No rales, No rhonchi, No wheezes     Cardiovascular: S1, S2, Regular rate and rhythm     Gastrointestinal: Soft, Non-tender, Non-distended     Extremities: No calf tenderness     Incision:

## 2021-01-21 NOTE — CONSULT NOTE ADULT - ASSESSMENT
80M with a history of gallbladder and prostate cancer with metastatic disease to the spine, with recent 12/2021 cancer recurrence-Patient is treated at [facility] with Dr Musa   Transported to  [facility] after a Syncopal episode that led to LOC, after which he was then transferred to [facility] with Nontraumatic ICH    - Patient with pancytopenia which was noted at Primary oncologist per patient   - CBC with WBC of 2.2, Hb 7.4 plt 121, ANC 1.4   - unknown for how long counts have been low, No blasts/ immature forms noted,   - Could be secondary to marrow involvemnet of malignancy vs stress from current medical issues  - Monitor for fevers/   - order CBC with daily differential   - Reached out to primary oncologist for further details on history and patients treatment course. Awaiting call back

## 2021-01-21 NOTE — PROGRESS NOTE ADULT - SUBJECTIVE AND OBJECTIVE BOX
DAVON ELMORE  ----------------------------------------  The patient was seen and evaluated for hyponatremia. Offers no complaints. Denied any headache or dizziness.    Vital Signs Last 24 Hrs  T(C): 36.6 (2021 15:50), Max: 37.1 (2021 00:45)  T(F): 97.8 (2021 15:50), Max: 98.8 (2021 00:45)  HR: 76 (2021 14:00) (68 - 87)  BP: 131/56 (2021 14:00) (99/68 - 149/67)  BP(mean): 79 (2021 14:00) (70 - 97)  RR: 19 (2021 14:00) (14 - 31)  SpO2: 94% (2021 14:00) (88% - 99%)    PHYSICAL EXAMINATION:  ----------------------------------------  General appearance: No acute distress, Awake, Alert  HEENT: Normocephalic, Atraumatic, Conjunctiva clear, EOMI  Neck: Supple, No JVD, No tenderness  Lungs: Breath sound equal bilaterally, No wheezes, No rales  Cardiovascular: S1S2, Regular rhythm, Right chemoport in place with dressing  Abdomen: Soft, Nontender, Nondistended, No guarding/rebound, Positive bowel sounds  Extremities: No clubbing, No cyanosis, No edema, No calf tenderness  Neuro: Strength equal bilaterally, No tremors  Psychiatric: Appropriate mood, Normal affect    LABORATORY STUDIES:  ----------------------------------------             7.4    2.29  )-----------( 121      ( 2021 12:50 )             21.5     01-21    126<L>  |  92<L>  |  15.0  ----------------------------<  100<H>  3.8   |  23.0  |  0.95    Ca    8.4<L>      2021 12:51  Phos  2.7       Mg     1.2         TPro  5.7<L>  /  Alb  3.2<L>  /  TBili  0.9  /  DBili  x   /  AST  42<H>  /  ALT  19  /  AlkPhos  134<H>      LIVER FUNCTIONS - ( 2021 20:38 )  Alb: 3.2 g/dL / Pro: 5.7 g/dL / ALK PHOS: 134 U/L / ALT: 19 U/L / AST: 42 U/L / GGT: x           PT/INR - ( 2021 22:30 )   PT: 12.5 sec;   INR: 1.08 ratio    PTT - ( 2021 22:30 )  PTT:28.2 sec    Urinalysis Basic - ( 2021 04:29 )  Color: Yellow / Appearance: Clear / S.015 / pH: x  Gluc: x / Ketone: Moderate  / Bili: Negative / Urobili: Negative mg/dL   Blood: x / Protein: 30 mg/dL / Nitrite: Negative   Leuk Esterase: Trace / RBC: 0-2 /HPF / WBC 0-2   Sq Epi: x / Non Sq Epi: Occasional / Bacteria: x    MEDICATIONS  (STANDING):  sodium chloride 3%. 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 [place] after sustaining a fall and was found to have intracranial hemorrhage and subsequently transferred to [facility] for further management. The patient was monitored in the intensive care unit and repeat radiological studies were without significant changes. He was also noted to have significant hyponatremia upon arrival and hypertonic saline was administered.    Hyponatremia - Unclear etiology. The patient reported that he had a prior admission for low sodium which required administration of intravenous saline due to dehydration from diarrhea. Most recently he reported that he had low sodium levels found at [facility] prior to presentation to [facility] [place]. The patient was noted to have been on hydrochlorothiazide which has been on hold since admission. Urine studies were obtained but difficult to interpret in the setting of hypertonic saline. Appears euvolemic on examination. Discussed with Nephrology for evaluation. For continued monitoring of sodium levels while on hypertonic saline.    Intracranial hemorrhage - Continue with serial neurological examinations. Most recent MRI noted     Hypertension - Close blood pressure monitoring.    Pancytopenia - Suspect secondary to underlying malignancy. The patient reported that he had not received chemotherapy as of yet. Hematology consultation pending. Recommendations to be reviewed when available. DAVON ELMORE  ----------------------------------------  The patient was seen and evaluated for hyponatremia. Offers no complaints. Denied any headache or dizziness.    Vital Signs Last 24 Hrs  T(C): 36.6 (2021 15:50), Max: 37.1 (2021 00:45)  T(F): 97.8 (2021 15:50), Max: 98.8 (2021 00:45)  HR: 76 (2021 14:00) (68 - 87)  BP: 131/56 (2021 14:00) (99/68 - 149/67)  BP(mean): 79 (2021 14:00) (70 - 97)  RR: 19 (2021 14:00) (14 - 31)  SpO2: 94% (2021 14:00) (88% - 99%)    PHYSICAL EXAMINATION:  ----------------------------------------  General appearance: No acute distress, Awake, Alert  HEENT: Normocephalic, Atraumatic, Conjunctiva clear, EOMI  Neck: Supple, No JVD, No tenderness  Lungs: Breath sound equal bilaterally, No wheezes, No rales  Cardiovascular: S1S2, Regular rhythm, Right chemoport in place with dressing  Abdomen: Soft, Nontender, Nondistended, No guarding/rebound, Positive bowel sounds  Extremities: No clubbing, No cyanosis, No edema, No calf tenderness  Neuro: Strength equal bilaterally, No tremors  Psychiatric: Appropriate mood, Normal affect    LABORATORY STUDIES:  ----------------------------------------             7.4    2.29  )-----------( 121      ( 2021 12:50 )             21.5     01-21    126<L>  |  92<L>  |  15.0  ----------------------------<  100<H>  3.8   |  23.0  |  0.95    Ca    8.4<L>      2021 12:51  Phos  2.7       Mg     1.2         TPro  5.7<L>  /  Alb  3.2<L>  /  TBili  0.9  /  DBili  x   /  AST  42<H>  /  ALT  19  /  AlkPhos  134<H>      LIVER FUNCTIONS - ( 2021 20:38 )  Alb: 3.2 g/dL / Pro: 5.7 g/dL / ALK PHOS: 134 U/L / ALT: 19 U/L / AST: 42 U/L / GGT: x           PT/INR - ( 2021 22:30 )   PT: 12.5 sec;   INR: 1.08 ratio    PTT - ( 2021 22:30 )  PTT:28.2 sec    Urinalysis Basic - ( 2021 04:29 )  Color: Yellow / Appearance: Clear / S.015 / pH: x  Gluc: x / Ketone: Moderate  / Bili: Negative / Urobili: Negative mg/dL   Blood: x / Protein: 30 mg/dL / Nitrite: Negative   Leuk Esterase: Trace / RBC: 0-2 /HPF / WBC 0-2   Sq Epi: x / Non Sq Epi: Occasional / Bacteria: x    MEDICATIONS  (STANDING):  sodium chloride 3%. 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 The patient was monitored in the intensive care unit and repeat radiological studies were without significant changes. He was also noted to have significant hyponatremia upon arrival and hypertonic saline was administered.    Hyponatremia - Unclear etiology. The patient reported that he had a prior admission for low sodium which required administration of intravenous saline due to dehydration from diarrhea. Most recently he reported that he had low sodium levels found at [facility] prior to presentation to [facility]. The patient was noted to have been on hydrochlorothiazide which has been on hold since admission. Urine studies were obtained but difficult to interpret in the setting of hypertonic saline. Appears euvolemic on examination. Discussed with Nephrology for evaluation. For continued monitoring of sodium levels while on hypertonic saline.    Intracranial hemorrhage - Continue with serial neurological examinations. Most recent MRI noted no new hemorrhage, and noted possible metastases versus subacute infarcts.    Hypertension - Close blood pressure monitoring.    Pancytopenia - Suspect secondary to underlying malignancy. The patient reported that he had not received chemotherapy as of yet. Hematology consultation pending. Recommendations to be reviewed when available.    Fall vs Syncope - Echocardiogram noted preserved left ventricular systolic function, mild left ventricular hypertrophy, and severe pulmonary hypertension. Carotid doppler pending.

## 2021-01-21 NOTE — PROGRESS NOTE ADULT - ASSESSMENT
80M s/p syncope fall with small right IVH     Plan   - q1 neuro checks   - Repeat CT in am   - -140   - Leucopenia /anemia- repeat CBC   - Resume regular diet  - Hyponatremia- 3 % at 30 cc/hr - monitor Na  - SCDs only for DVT ppx  80M s/p ? syncope with  fall with small right IVH/ IPH     Plan   - q2 neuro checks   - Repeat CT - stable heme   - MRI  +/- contrast R/O mets   - PT/OT/palliative   - cerebral edema - Na goal over 48 hrs - 135- < 145  - No need for AED  - Carotid duplex    Cardiovascular  - -150   - Hold - losaratan / atenolol  ( Na low , BP nl and HR nl)  - ECHO  - Keep on tele       Resp- Lung mets  Maintain sats > 92 %    Heme- Pancytopenia - likely secondary to marrow infiltration   - Keep PLT- > 100  -Transfuse if HGB < 7.0  -Leucopenia - monitor   - Contact Oncologist     GI - metastatic GB Ca   - NPO for now till after MR  - Sl elevated Alk phos and AST I  - Resume regular diet    Endo   -Euvolemic Hyponatremia - chronic   -Slowly increase Na - not > 8-10 mmoles in 24 bHrs   -3% at 30cc/hr  - TSH and Free     ID - monitor for fever     DVT prophylaxsis - SCD for now stable CT for 48 hrs before starting lovenox 40mg q day SCDs only for DVT ppx

## 2021-01-21 NOTE — CONSULT NOTE ADULT - ATTENDING COMMENTS
COUNSELING:    Face to face meeting to discuss Advanced Care Planning - Time Spent ______ Minutes.  See goals of care note.    More than 50% time spent in counseling and coordinating care. _30_____ Minutes.     Thank you for the opportunity to assist with the care of this patient.   [place] [facility] [phone].

## 2021-01-21 NOTE — PHYSICAL THERAPY INITIAL EVALUATION ADULT - ASR WT BEARING STATUS EVAL
Wife [place] breakfast essentials.  Informed wife no weight-bearing restrictions

## 2021-01-22 LAB
ANION GAP SERPL CALC-SCNC: 10 MMOL/L — SIGNIFICANT CHANGE UP (ref 5–17)
ANION GAP SERPL CALC-SCNC: 8 MMOL/L — SIGNIFICANT CHANGE UP (ref 5–17)
BUN SERPL-MCNC: 14 MG/DL — SIGNIFICANT CHANGE UP (ref 8–20)
BUN SERPL-MCNC: 18 MG/DL — SIGNIFICANT CHANGE UP (ref 8–20)
CALCIUM SERPL-MCNC: 8.1 MG/DL — LOW (ref 8.6–10.2)
CALCIUM SERPL-MCNC: 8.1 MG/DL — LOW (ref 8.6–10.2)
CHLORIDE SERPL-SCNC: 96 MMOL/L — LOW (ref 98–107)
CHLORIDE SERPL-SCNC: 96 MMOL/L — LOW (ref 98–107)
CO2 SERPL-SCNC: 25 MMOL/L — SIGNIFICANT CHANGE UP (ref 22–29)
CO2 SERPL-SCNC: 27 MMOL/L — SIGNIFICANT CHANGE UP (ref 22–29)
CORTIS AM PEAK SERPL-MCNC: 16.7 UG/DL — SIGNIFICANT CHANGE UP (ref 6–18.4)
CREAT SERPL-MCNC: 1.1 MG/DL — SIGNIFICANT CHANGE UP (ref 0.5–1.3)
CREAT SERPL-MCNC: 1.25 MG/DL — SIGNIFICANT CHANGE UP (ref 0.5–1.3)
GLUCOSE BLDC GLUCOMTR-MCNC: 112 MG/DL — HIGH (ref 70–99)
GLUCOSE SERPL-MCNC: 87 MG/DL — SIGNIFICANT CHANGE UP (ref 70–99)
GLUCOSE SERPL-MCNC: 98 MG/DL — SIGNIFICANT CHANGE UP (ref 70–99)
HCT VFR BLD CALC: 21.9 % — LOW (ref 39–50)
HGB BLD-MCNC: 7.3 G/DL — LOW (ref 13–17)
MAGNESIUM SERPL-MCNC: 1.9 MG/DL — SIGNIFICANT CHANGE UP (ref 1.6–2.6)
MCHC RBC-ENTMCNC: 30.8 PG — SIGNIFICANT CHANGE UP (ref 27–34)
MCHC RBC-ENTMCNC: 33.3 GM/DL — SIGNIFICANT CHANGE UP (ref 32–36)
MCV RBC AUTO: 92.4 FL — SIGNIFICANT CHANGE UP (ref 80–100)
NT-PROBNP SERPL-SCNC: 1149 PG/ML — HIGH (ref 0–300)
OSMOLALITY SERPL: 277 MOSMOL/KG — LOW (ref 280–301)
PHOSPHATE SERPL-MCNC: 2.8 MG/DL — SIGNIFICANT CHANGE UP (ref 2.4–4.7)
PLATELET # BLD AUTO: 113 K/UL — LOW (ref 150–400)
POTASSIUM SERPL-MCNC: 3.9 MMOL/L — SIGNIFICANT CHANGE UP (ref 3.5–5.3)
POTASSIUM SERPL-MCNC: 4.6 MMOL/L — SIGNIFICANT CHANGE UP (ref 3.5–5.3)
POTASSIUM SERPL-SCNC: 3.9 MMOL/L — SIGNIFICANT CHANGE UP (ref 3.5–5.3)
POTASSIUM SERPL-SCNC: 4.6 MMOL/L — SIGNIFICANT CHANGE UP (ref 3.5–5.3)
RBC # BLD: 2.37 M/UL — LOW (ref 4.2–5.8)
RBC # FLD: 16 % — HIGH (ref 10.3–14.5)
SODIUM SERPL-SCNC: 131 MMOL/L — LOW (ref 135–145)
SODIUM SERPL-SCNC: 131 MMOL/L — LOW (ref 135–145)
T4 AB SER-ACNC: 5.8 UG/DL — SIGNIFICANT CHANGE UP (ref 4.5–12)
T4 FREE SERPL-MCNC: 1 NG/DL — SIGNIFICANT CHANGE UP (ref 0.9–1.8)
TSH SERPL-MCNC: 5.39 UIU/ML — HIGH (ref 0.27–4.2)
URATE SERPL-MCNC: 3.7 MG/DL — SIGNIFICANT CHANGE UP (ref 3.4–7)
WBC # BLD: 2.34 K/UL — LOW (ref 3.8–10.5)
WBC # FLD AUTO: 2.34 K/UL — LOW (ref 3.8–10.5)

## 2021-01-22 PROCEDURE — 99233 SBSQ HOSP IP/OBS HIGH 50: CPT

## 2021-01-22 PROCEDURE — 70450 CT HEAD/BRAIN W/O DYE: CPT | Mod: 26

## 2021-01-22 RX ORDER — SODIUM CHLORIDE 9 MG/ML
2 INJECTION INTRAMUSCULAR; INTRAVENOUS; SUBCUTANEOUS
Refills: 0 | Status: DISCONTINUED | OUTPATIENT
Start: 2021-01-22 | End: 2021-01-24

## 2021-01-22 RX ORDER — SODIUM CHLORIDE 9 MG/ML
1 INJECTION INTRAMUSCULAR; INTRAVENOUS; SUBCUTANEOUS
Refills: 0 | Status: DISCONTINUED | OUTPATIENT
Start: 2021-01-22 | End: 2021-01-22

## 2021-01-22 RX ADMIN — SODIUM CHLORIDE 30 MILLILITER(S): 5 INJECTION, SOLUTION INTRAVENOUS at 06:59

## 2021-01-22 RX ADMIN — ATENOLOL 50 MILLIGRAM(S): 25 TABLET ORAL at 20:13

## 2021-01-22 RX ADMIN — ATENOLOL 100 MILLIGRAM(S): 25 TABLET ORAL at 04:47

## 2021-01-22 RX ADMIN — SODIUM CHLORIDE 2 GRAM(S): 9 INJECTION INTRAMUSCULAR; INTRAVENOUS; SUBCUTANEOUS at 17:41

## 2021-01-22 RX ADMIN — Medication 300 MILLIGRAM(S): at 12:44

## 2021-01-22 RX ADMIN — ATORVASTATIN CALCIUM 10 MILLIGRAM(S): 80 TABLET, FILM COATED ORAL at 20:12

## 2021-01-22 NOTE — PROGRESS NOTE ADULT - SUBJECTIVE AND OBJECTIVE BOX
[facility] Division of Hospital Medicine    Chief Complaint:  Fall /ich/ hyponatremia    SUBJECTIVE: reports sleepless due to late imaging, denied HA, dizzines, vertigo, CP    OVERNIGHT EVENTS: none reported    Patient denies chest pain, SOB, abd pain, N/V, fever, chills, dysuria or any other complaints. All remainder ROS negative.     MEDICATIONS  (STANDING):  allopurinol 300 milliGRAM(s) Oral daily  ATENolol  Tablet 100 milliGRAM(s) Oral daily  ATENolol  Tablet 50 milliGRAM(s) Oral at bedtime  atorvastatin 10 milliGRAM(s) Oral at bedtime  sodium chloride 1 Gram(s) Oral two times a day    MEDICATIONS  (PRN):        I&O's Summary    2021 07:01  -  2021 07:00  --------------------------------------------------------  IN: 660 mL / OUT: 1200 mL / NET: -540 mL        PHYSICAL EXAM:  Vital Signs Last 24 Hrs  T(C): 36.6 (2021 08:00), Max: 37 (2021 00:00)  T(F): 97.8 (2021 08:00), Max: 98.6 (2021 00:00)  HR: 70 (2021 08:00) (69 - 81)  BP: 147/79 (2021 08:00) (115/52 - 156/72)  BP(mean): 101 (2021 08:00) (68 - 101)  RR: 17 (2021 08:00) (14 - 30)  SpO2: 98% (2021 08:00) (83% - 100%)      CONSTITUTIONAL: NAD, well-developed, well-groomed  ENMT: Moist oral mucosa, no pharyngeal injection or exudates; normal dentition; No JVD  RESPIRATORY: Normal respiratory effort; lungs are clear to auscultation bilaterally  CARDIOVASCULAR: Regular rate and rhythm, normal S1 and S2, no murmur/rub/gallop; No lower extremity edema; Peripheral pulses are 2+ bilaterally, R chest port  ABDOMEN: Nontender to palpation, normoactive bowel sounds, no rebound/guarding; No hepatosplenomegaly  MUSCLOSKELETAL:  Normal gait; no clubbing or cyanosis of digits; no joint swelling or tenderness to palpation  PSYCH: A+O to person, place, and time; affect appropriate  NEUROLOGY: CN 2-12 are intact and symmetric; no gross sensory deficits; was observed moving all 4 ext against gravity cooperating with exam.   SKIN: No rashes; no palpable lesions    LABS:                        7.3    2.34  )-----------( 113      ( 2021 05:44 )             21.9     01-    131<L>  |  96<L>  |  14.0  ----------------------------<  87  3.9   |  25.0  |  1.10    Ca    8.1<L>      2021 05:44  Phos  2.8       Mg     1.9         TPro  5.7<L>  /  Alb  3.2<L>  /  TBili  0.9  /  DBili  x   /  AST  42<H>  /  ALT  19  /  AlkPhos  134<H>      PT/INR - ( 2021 22:30 )   PT: 12.5 sec;   INR: 1.08 ratio         PTT - ( 2021 22:30 )  PTT:28.2 sec      Urinalysis Basic - ( 2021 04:29 )    Color: Yellow / Appearance: Clear / S.015 / pH: x  Gluc: x / Ketone: Moderate  / Bili: Negative / Urobili: Negative mg/dL   Blood: x / Protein: 30 mg/dL / Nitrite: Negative   Leuk Esterase: Trace / RBC: 0-2 /HPF / WBC 0-2   Sq Epi: x / Non Sq Epi: Occasional / Bacteria: x        CAPILLARY BLOOD GLUCOSE            RADIOLOGY & ADDITIONAL TESTS:  Results Reviewed:   Imaging Personally Reviewed:  Electrocardiogram Personally Reviewed:

## 2021-01-22 NOTE — OCCUPATIONAL THERAPY INITIAL EVALUATION ADULT - SPECIAL TRAINING, OT EVAL
Functional mobility with supervision with SAC due to decreased strength, decreased endurance and decreased balance; cues for foot placement, body positioning with relation to cane placement and safe negotiation of environment/obstacles. Pt requires increased time and verbal/tactile cues throughout mobility for safety.

## 2021-01-22 NOTE — OCCUPATIONAL THERAPY INITIAL EVALUATION ADULT - ADDITIONAL COMMENTS
Pt lives in house with 5 LIN and no steps inside. Bathroom has shower stall with doors. Pt owns RW, cane. Pt is right handed. Pt drives. Pt's wife is able and available to assist upon discharge.

## 2021-01-22 NOTE — GOALS OF CARE CONVERSATION - ADVANCED CARE PLANNING - CONVERSATION DETAILS
Palliative care social work note.    SW contacted patients wife Annabel to offer support in facilitating coping with patients illness. SW reviewed involvement of palliative care service and engaged wife in exploring her concerns. Annabel reports that she has been overwhelmed at changes in their life so quickly since patient diagnosed only 7 weeks ago. Annabel reports that she has 3 sons whom are supportive and helping her coordinate with physicians especially son Bienvenido. SW counseled wife in addressing her fears of loss and she verbalized not wanting patient to suffer but is still trying to come to terms with dx and progression of illness. SW educated Annabel regarding ACS services as well in community for additional support.
I called wife Annabel, who was concerned, and frustrated about the No VISITING rule in effect because of COVID.  She said her  has been anxious and started on Lexapro 10mg at bedtime for past 3 weeks; has he been getting it?  I told her nothing by mouth until MRI with contrast completed this afternoon.    She talked about his cancer recurrence, his decision to seek oncologic treatment with [facility]. He was scheduled for his first chemotherapy appt next week. That of course has been cancelled and postponed until his present problem has been  addressed.    I asked her if they had a living will completed-she confirmed it was done, documents can be sent here, by email or fax  she will ask her son to help getting the document to [facility].    She admitted that they are both DNR/DNI, do not want to be kept on life support, want a natural death.  Discussion with Bienvenido is needed to clarify goals and directives    Patient has capacity to make own  medical decisions, but it will not be confirmed until getting copy of his living will.  No need to discuss MOLST at this time.

## 2021-01-22 NOTE — PROGRESS NOTE ADULT - ASSESSMENT
80M s/p syncope fall transfer from [facility] with right IVH, f/u CT brain stable.  MRI w/wo performed, concern for leptomeningeal spread of cancer.      -Pt seen and evaluated w/ Dr. Castro  -recommend  LP to evaluate for leptomeningeal spread     - No neurosurgical contraindications for LP    - Recommend rad/onc consult  - heme/onc following, notes stage IV cholangiocarcinoma, known spine, lung mets, liver mets; [place] [facility] pancytopenia-may be secondary to bone mets; tx for Hb under 7, will follow and [place] care [place] [facility]  - SCDs only for DVT ppx   - Repeat MRI in 4-6 weeks  - Follow-up as outpatient with Dr. Castro in 1 month  -follow up w/ Dr. Noguera, neuro IR as outpatient in 2 weeks  - Will continue to follow

## 2021-01-22 NOTE — PROGRESS NOTE ADULT - SUBJECTIVE AND OBJECTIVE BOX
NEPHROLOGY INTERVAL HPI/OVERNIGHT EVENTS:    No new events.    MEDICATIONS  (STANDING):  allopurinol 300 milliGRAM(s) Oral daily  ATENolol  Tablet 100 milliGRAM(s) Oral daily  ATENolol  Tablet 50 milliGRAM(s) Oral at bedtime  atorvastatin 10 milliGRAM(s) Oral at bedtime  sodium chloride 2 Gram(s) Oral two times a day    MEDICATIONS  (PRN):      Allergies    Allergy Status Unknown  No Known Allergies        Vital Signs Last 24 Hrs  T(C): 36.6 (2021 08:00), Max: 37 (2021 00:00)  T(F): 97.8 (2021 08:00), Max: 98.6 (2021 00:00)  HR: 70 (2021 08:00) (70 - 81)  BP: 147/79 (2021 08:00) (115/52 - 156/72)  BP(mean): 101 (2021 08:00) (68 - 101)  RR: 17 (2021 08:00) (14 - 30)  SpO2: 98% (2021 08:00) (83% - 100%)  T(C): 36.6 (2021 08:00), Max: 37 (2021 00:00)  T(F): 97.8 (2021 08:00), Max: 98.6 (2021 00:00)  HR: 70 (2021 08:00) (70 - 81)  BP: 147/79 (2021 08:00) (115/52 - 156/72)  BP(mean): 101 (2021 08:00) (68 - 101)  RR: 17 (2021 08:00) (14 - 30)  SpO2: 98% (2021 08:00) (83% - 100%)  Daily     Daily Weight in k.6 (2021 04:42)    PHYSICAL EXAM:    GENERAL: Appears comfortable oob in chair  HEAD:  No HA  EYES:   ENMT:   NECK: right sided PIC site clean  NERVOUS SYSTEM: Alert  oriented   CHEST/LUNG: No wheezes, no  02  HEART: No gallop  ABDOMEN: NT  EXTREMITIES:  Trace edema  LYMPH:   SKIN:    Neg      LABS:                        7.3    2.34  )-----------( 113      ( 2021 05:44 )             21.9         131<L>  |  96<L>  |  14.0  ----------------------------<  87  3.9   |  25.0  |  1.10    Ca    8.1<L>      2021 05:44  Phos  2.8       Mg     1.9         TPro  5.7<L>  /  Alb  3.2<L>  /  TBili  0.9  /  DBili  x   /  AST  42<H>  /  ALT  19  /  AlkPhos  134<H>      PT/INR - ( 2021 22:30 )   PT: 12.5 sec;   INR: 1.08 ratio         PTT - ( 2021 22:30 )  PTT:28.2 sec  Urinalysis Basic - ( 2021 04:29 )    Color: Yellow / Appearance: Clear / S.015 / pH: x  Gluc: x / Ketone: Moderate  / Bili: Negative / Urobili: Negative mg/dL   Blood: x / Protein: 30 mg/dL / Nitrite: Negative   Leuk Esterase: Trace / RBC: 0-2 /HPF / WBC 0-2   Sq Epi: x / Non Sq Epi: Occasional / Bacteria: x      Magnesium, Serum: 1.9 mg/dL ( @ 05:44)  Phosphorus Level, Serum: 2.8 mg/dL ( @ 05:44)          RADIOLOGY & ADDITIONAL TESTS:

## 2021-01-22 NOTE — OCCUPATIONAL THERAPY INITIAL EVALUATION ADULT - PLANNED THERAPY INTERVENTIONS, OT EVAL
toilet/ADL retraining/balance training/bed mobility training/parent/caregiver training.../strengthening/transfer training

## 2021-01-22 NOTE — CONSULT NOTE ADULT - CONSULT REQUESTED DATE/TIME
20-Jan-2021 20:45
21-Jan-2021 09:57
21-Jan-2021
21-Jan-2021 14:12
21-Jan-2021 17:24
22-Jan-2021 09:27

## 2021-01-22 NOTE — CONSULT NOTE ADULT - SUBJECTIVE AND OBJECTIVE BOX
HPI: Patient is a 80y Male seen on consultation for the evaluation and management of Stage IV cholangiocarcinoma and prostate cancer.  Pt seen in conjuction with [facility] as part of partnership-pt seen at request of [facility] [place].  Noted previous Onc note, in agreement.  Pt has PMHx of adenoca of prostate , treated qwith salvage RT on , Stage IV cholangiocarcinoma; dz at T9, s/p resection of epidural tumor T8-T10 with post fixation on 2020.  Received post op SBRT to T 9/T10 and L2/L4 ; [place] care with [place]. Musa [place] [facility] with plans to bein tx with Nash/Ox-not yet begun. [place] [facility] [place]; [facility], with stable radiologic changes, poss intraorbital met.    Treated for hyponatremia  Mildl;y pancytopenic  Seen at bedside, awake, alert, comfortable, without SOB, chest pain, headaches; ready to eat breakfast    PAST MEDICAL & SURGICAL HISTORY:  Cancer of prostate with low recurrence risk (stage T1-2a and [place] &lt; 7 and PSA &lt; 10)  2010 treated with radiation    Prostate cancer      Sciatica  pt denies recent c/o    Gout    Hypercholesterolemia    HTN - Hypertension    Excision Cataract OS      Robotic Assisted Prostatectomy  2008        REVIEW OF SYSTEMS      General:Awake, comfortable	    Skin/Breast:no rash  	  Ophthalmologic:no visual complaints  	    Respiratory and Thorax:denies SOB  	  Cardiovascular:	no chest pain    Gastrointestinal:	no N/v/abd pain      Neurological:	s/p syncopal episode no current headaches or dizziness        Hematology/Lymphatics:	    Endocrine:	    Allergic/Immunologic:	    MEDICATIONS  (STANDING):  allopurinol 300 milliGRAM(s) Oral daily  ATENolol  Tablet 100 milliGRAM(s) Oral daily  ATENolol  Tablet 50 milliGRAM(s) Oral at bedtime  atorvastatin 10 milliGRAM(s) Oral at bedtime  sodium chloride 1 Gram(s) Oral two times a day    MEDICATIONS  (PRN):      Allergies    Allergy Status Unknown  No Known Allergies    Intolerances        SOCIAL HISTORY:    Smoking Status:denioed  FAMILY HISTORY:  Family history of colon cancer in father (Father)        ital Signs Last 24 Hrs  T(C): 36.6 (2021 08:00), Max: 37 (2021 00:00)  T(F): 97.8 (2021 08:00), Max: 98.6 (2021 00:00)  HR: 70 (2021 08:00) (69 - 87)  BP: 147/79 (2021 08:00) (99/68 - 156/72)  BP(mean): 101 (2021 08:00) (68 - 101)  RR: 17 (2021 08:00) (14 - 30)  SpO2: 98% (2021 08:00) (83% - 100%)    PHYSICAL EXAM:      Constitutional:Awake, alert, comfortable    Eyes:anicteric    Neck:no adenopathy      Respiratory:clear    Cardiovascular:RRR normal S1S2    Gastrointestinal:soft, non-tender, pos BS    Extremities:no edema        LABS:                        7.3    2.34  )-----------( 113      ( 2021 05:44 )             21.9     01-22    131<L>  |  96<L>  |  14.0  ----------------------------<  87  3.9   |  25.0  |  1.10    Ca    8.1<L>      2021 05:44  Phos  2.8     -  Mg     1.9     -    TPro  5.7<L>  /  Alb  3.2<L>  /  TBili  0.9  /  DBili  x   /  AST  42<H>  /  ALT  19  /  AlkPhos  134<H>  01-20    PT/INR - ( 2021 22:30 )   PT: 12.5 sec;   INR: 1.08 ratio         PTT - ( 2021 22:30 )  PTT:28.2 sec  Urinalysis Basic - ( 2021 04:29 )    Color: Yellow / Appearance: Clear / S.015 / pH: x  Gluc: x / Ketone: Moderate  / Bili: Negative / Urobili: Negative mg/dL   Blood: x / Protein: 30 mg/dL / Nitrite: Negative   Leuk Esterase: Trace / RBC: 0-2 /HPF / WBC 0-2   Sq Epi: x / Non Sq Epi: Occasional / Bacteria: x        RADIOLOGY & ADDITIONAL STUDIES:

## 2021-01-22 NOTE — PROGRESS NOTE ADULT - SUBJECTIVE AND OBJECTIVE BOX
[place] [facility] Faculty Practice                                                               Office: [address] [address]                                                              Telephone: [phone]. Fax:[phone]                                                                             PROGRESS NOTE  Reason for follow up: Syncope  Overnight: No new events.   Update: transferred out of NSICU, no event on telemetry, TTE done with normal LV systolic function/no significant valvular function, incidental elevated PASP 62mmHg, Na improving now on salt-tab, restarted home atenolol SBP 140s, no orthostatic BP yet.       Review of symptoms:   Cardiac:  No chest pain. No dyspnea. No palpitations.  Respiratory: No cough. No dyspnea  Gastrointestinal: No diarrhea. No abdominal pain. No bleeding.     Past medical history: No updates.   	  Vital Signs Last 24 Hrs  T(C): 36.6 (01-22-21 @ 08:00), Max: 37 (01-22-21 @ 00:00)  T(F): 97.8 (01-22-21 @ 08:00), Max: 98.6 (01-22-21 @ 00:00)  HR: 70 (01-22-21 @ 08:00) (69 - 81)  BP: 147/79 (01-22-21 @ 08:00) (115/52 - 156/72)  BP(mean): 101 (01-22-21 @ 08:00) (68 - 101)  RR: 17 (01-22-21 @ 08:00) (14 - 30)  SpO2: 98% (01-22-21 @ 08:00) (83% - 100%)  I&O's Summary    21 Jan 2021 07:01  -  22 Jan 2021 07:00  --------------------------------------------------------  IN: 660 mL / OUT: 1200 mL / NET: -540 mL          PHYSICAL EXAM:  Appearance: Comfortable. No acute distress  HEENT:  Head and neck: Atraumatic. Normocephalic.  Normal oral mucosa, PERRL, Neck is supple. No JVD, No carotid bruit.   Neurologic: A&Ox 3, no focal deficits. EOMI, Cranial nerves are intact.  Lymphatic: No cervical lymphadenopathy  Cardiovascular: Normal S1 S2, No murmur, rubs/gallops. No JVD, No edema, R-Mediport  Respiratory: Lungs clear to auscultation  Gastrointestinal:  Soft, Non-tender, + BS  Lower Extremities: No edema  Psychiatry: Patient is calm. No agitation. Mood & affect appropriate  Skin: diffuse ecchymoses UE      CURRENT MEDICATIONS:  MEDICATIONS  (STANDING):  allopurinol 300 milliGRAM(s) Oral daily  ATENolol  Tablet 100 milliGRAM(s) Oral daily  ATENolol  Tablet 50 milliGRAM(s) Oral at bedtime  atorvastatin 10 milliGRAM(s) Oral at bedtime  sodium chloride 1 Gram(s) Oral two times a day    MEDICATIONS  (PRN):      DIAGNOSTIC TESTING:  [ ] Echocardiogram:   < from: TTE Echo Complete w/o Contrast w/ Doppler (01.21.21 @ 11:59) >  Summary:   1. Left ventricular ejection fraction, by visual estimation, is 65 to 70%.   2. Normal global left ventricular systolic function.   3. Normal left ventricular internal cavity size.   4. Spectral Doppler shows impaired relaxation pattern of left ventricular myocardial filling (Grade I diastolic dysfunction).   5. There is mild concentric left ventricular hypertrophy.   6. Normal left atrial size.   7. Normal right atrial size.   8. There is no evidence of pericardial effusion.   9. Mild mitral valve regurgitation.  10. Moderate tricuspid regurgitation.  11. Mild to moderate aortic regurgitation.  12. Mild pulmonic valve regurgitation.  13. Estimated pulmonary artery systolic pressure is 62.1 mmHg assuming a right atrial pressure of 15 mmHg, which is consistent with severe pulmonary hypertension.  14. Aortic root measured at Sinus of Valsalva is dilated.    < end of copied text >    [ ]  Catheterization:  [ ] Stress Test:      Labs:                        7.3    2.34  )-----------( 113      ( 22 Jan 2021 05:44 )             21.9     01-22    131<L>  |  96<L>  |  14.0  ----------------------------<  87  3.9   |  25.0  |  1.10    Ca    8.1<L>      22 Jan 2021 05:44  Phos  2.8     01-22  Mg     1.9     01-22    TPro  5.7<L>  /  Alb  3.2<L>  /  TBili  0.9  /  DBili  x   /  AST  42<H>  /  ALT  19  /  AlkPhos  134<H>  01-20        Serum Pro-Brain Natriuretic Peptide: 1149 pg/mL (01-22-21 @ 05:44)      TELEMETRY: Sinus 60-70s    < from: CT Head No Cont (01.22.21 @ 03:04) >    IMPRESSION:  Unchanged small focus of hemorrhage in the right lateral ventricle. No new or enlarging hemorrhage.    The enhancing foci in the medial left precentral gyrus and left occipital lobe are better seen on recent MRI.    1.3 cm nodule in the left superior rectus muscle is again seen, may represent metastasis versus lymphoma, as seen on MRI.    < end of copied text >

## 2021-01-22 NOTE — CONSULT NOTE ADULT - ASSESSMENT
Imp:  Stage IV cholangiocarcinoma, known spine, lung mets, liver mets; [place] [facility]  [place]mitted with ICH-stable changes on radiologic studies-for observation  Pancytopenia-may be secondary to bone mets; tx for Hb under 7  Hyponatremia-managed by Nephrology-thought multifactorial, poss SIADH; to monitor    Will follow and coordinate care with [facility]    Bentley Montgomery MD [facility] [phone]

## 2021-01-22 NOTE — CONSULT NOTE ADULT - CONSULT REASON
Stage IV cholangiocarcinoma
Hypo- Natremia
IVH
Syncope
Non Traumatic ICH following syncopal episode
Pancytopenia

## 2021-01-22 NOTE — PROGRESS NOTE ADULT - SUBJECTIVE AND OBJECTIVE BOX
INTERVAL HPI/OVERNIGHT EVENTS:  81 Y/O male w/ PMHx of prostate CA, HTN, [place] [facility] [place], found w/ right lateral IVH on CT brain. Neurosurgery called for further evaluation.       Vital Signs Last 24 Hrs  T(C): 36.6 (2021 08:00), Max: 37 (2021 00:00)  T(F): 97.8 (2021 08:00), Max: 98.6 (2021 00:00)  HR: 70 (2021 08:00) (70 - 81)  BP: 147/79 (2021 08:00) (115/52 - 156/72)  BP(mean): 101 (2021 08:00) (68 - 101)  RR: 17 (2021 08:00) (14 - 30)  SpO2: 98% (2021 08:00) (83% - 100%)      PHYSICAL EXAM:  GENERAL: NAD, well-groomed, well-developed  HEAD:  Atraumatic, normocephalic  MENTAL STATUS: AAO x3; Awake; Opens eyes spontaneously; Appropriately conversant without aphasia; following simple commands  CRANIAL NERVES: LJ; EOMI; no facial asymmetry; facial sensation grossly intact to light touch b/l;  tongue midline  MOTOR: strength 5/5 B/L upper and lower extremities; sensation grossly intact all extremities;   CHEST/LUNG: +breath sounds bilaterally; no rales, rhonchi, wheezing, appreciated   HEART: +S1/+S2; Regular rate and rhythm; no murmurs, rubs, appreciated   ABDOMEN: Soft, nontender, nondistended; bowel sounds present   EXTREMITIES:   no clubbing, cyanosis  SKIN: Warm, dry      LABS:                        7.3    2.34  )-----------( 113      ( 2021 05:44 )             21.9     01-22    131<L>  |  96<L>  |  14.0  ----------------------------<  87  3.9   |  25.0  |  1.10    Ca    8.1<L>      2021 05:44  Phos  2.8     22  Mg     1.9     -22    TPro  5.7<L>  /  Alb  3.2<L>  /  TBili  0.9  /  DBili  x   /  AST  42<H>  /  ALT  19  /  AlkPhos  134<H>      PT/INR - ( 2021 22:30 )   PT: 12.5 sec;   INR: 1.08 ratio         PTT - ( 2021 22:30 )  PTT:28.2 sec  Urinalysis Basic - ( 2021 04:29 )    Color: Yellow / Appearance: Clear / S.015 / pH: x  Gluc: x / Ketone: Moderate  / Bili: Negative / Urobili: Negative mg/dL   Blood: x / Protein: 30 mg/dL / Nitrite: Negative   Leuk Esterase: Trace / RBC: 0-2 /HPF / WBC 0-2   Sq Epi: x / Non Sq Epi: Occasional / Bacteria: x         @ 07:01  -   @ 07:00  --------------------------------------------------------  IN: 660 mL / OUT: 1200 mL / NET: -540 mL        RADIOLOGY & ADDITIONAL TESTS:    EXAM:  CT BRAIN  PROCEDURE DATE:  2021    IMPRESSION:  Small focus of hemorrhage in the right lateral ventricle is unchanged. No new or enlarging hemorrhage.  1.3 cm nodule in the left orbit is again seen, may represent a small hemangioma; may be further evaluated with MRI.      EXAM:  CT BRAIN  PROCEDURE DATE:  2021    IMPRESSION:  Unchanged small focus of hemorrhage in the right lateral ventricle. No new or enlarging hemorrhage.  The enhancing foci in the medial left precentral gyrus and left occipital lobe are better seen on recent MRI.  1.3 cm nodule in the left superior rectus muscle is again seen, may represent metastasis versus lymphoma, as seen on MRI.      EXAM:  MR BRAIN WAW IC  PROCEDURE DATE:  2021    IMPRESSION:  -Redemonstrated intraventricular hemorrhage. No gross new hemorrhage identified.  -Punctate foci of enhancement and associated FLAIR hyperintense signal involving the medial left precentral gyrus and left inferior occipital lobe. Differential includes metastases versus subacute infarcts.  -1 cm intraorbital lesion intrinsic to the left superior rectus muscle with associated restricted diffusion. Differential includes metastasis versus lymphoma.

## 2021-01-22 NOTE — OCCUPATIONAL THERAPY INITIAL EVALUATION ADULT - PERTINENT HX OF CURRENT PROBLEM, REHAB EVAL
Pt presents to [facility] s/p fall and found to have intracranial hemorrhage. Pt was transferred to [facility] for evaluation. Pt with gallbladder and prostate cancer with metastatic disease to the spine.

## 2021-01-22 NOTE — PROGRESS NOTE ADULT - SUBJECTIVE AND OBJECTIVE BOX
INTERVAL HPI/OVERNIGHT EVENTS: 81 yo Male Patient transferred from [facility] with R IVC, after syncopal episode at home and fall.    F/U Note  Patient transferred to 4T telemetry unit from ICU  No new events overnight  VSS afebrile sleeping easily arouseable  OOB/Ch Late night MRI interrupted his sleep  Neuro signs stable, unchanged  Tolerating diet no N/V or difficulty swallowing  Hyponatremia slowly normalizing  He denies headache, CP, palpitations, dizziness, visual disturbance SOB  PT/OT evaluation pending    80y old  Male who presents with a chief complaint of Right Intraventricular Hemorrhage (2021 14:16)    Present Symptoms:     Dyspnea: 0   Nausea/Vomiting:  No  Anxiety:  Yes generalized  Depression:  No  Fatigue: Yes   Loss of appetite: No    Pain: deneis            Character-            Duration-            Effect-            Factors-            Frequency-            Location-            Severity-    Review of Systems: Reviewed                       All others negative    MEDICATIONS  (STANDING):  allopurinol 300 milliGRAM(s) Oral daily  ATENolol  Tablet 100 milliGRAM(s) Oral daily  ATENolol  Tablet 50 milliGRAM(s) Oral at bedtime  atorvastatin 10 milliGRAM(s) Oral at bedtime  sodium chloride 2 Gram(s) Oral two times a day    MEDICATIONS  (PRN):    General: alert  oriented x _3___ resting with eyes closed            obese   HEENT: normal      Lungs: comfortable    CV: normal     GI: obese distended                  constipation  yesterday    :   luli    MSK: weakness  edema         OOB/Ch waiting for PT eval    Skin: normal no rash    LABS:                        7.3    2.34  )-----------( 113      ( 2021 05:44 )             21.9         131<L>  |  96<L>  |  14.0  ----------------------------<  87  3.9   |  25.0  |  1.10    Ca    8.1<L>      2021 05:44  Phos  2.8       Mg     1.9         TPro  5.7<L>  /  Alb  3.2<L>  /  TBili  0.9  /  DBili  x   /  AST  42<H>  /  ALT  19  /  AlkPhos  134<H>      PT/INR - ( 2021 22:30 )   PT: 12.5 sec;   INR: 1.08 ratio    PTT - ( 2021 22:30 )  PTT:28.2 sec  Urinalysis Basic - ( 2021 04:29 )    Color: Yellow / Appearance: Clear / S.015 / pH: x  Gluc: x / Ketone: Moderate  / Bili: Negative / Urobili: Negative mg/dL   Blood: x / Protein: 30 mg/dL / Nitrite: Negative   Leuk Esterase: Trace / RBC: 0-2 /HPF / WBC 0-2   Sq Epi: x / Non Sq Epi: Occasional / Bacteria: x    I&O's Summary    2021 07:01  -  2021 07:00  --------------------------------------------------------  IN: 660 mL / OUT: 1200 mL / NET: -540 mL    Vital Signs Last 24 Hrs  T(C): 36.6 (2021 08:00), Max: 37 (2021 00:00)  T(F): 97.8 (2021 08:00), Max: 98.6 (2021 00:00)  HR: 70 (2021 08:00) (70 - 81)  BP: 147/79 (2021 08:00) (115/52 - 156/72)  BP(mean): 101 (2021 08:00) (68 - 101)  RR: 17 (2021 08:00) (14 - 30)  SpO2: 98% (2021 08:00) (83% - 100%)    RADIOLOGY & ADDITIONAL STUDIES:  < from: MR Head w/wo IV Cont (21 @ 15:33) >    IMPRESSION:  -Redemonstrated intraventricular hemorrhage. No gross new hemorrhage identified.    -Punctate foci of enhancement and associated FLAIR hyperintense signal involving the medial left precentral gyrus and left inferior occipital lobe. Differential includes metastases versus subacute infarcts.    -1 cm intraorbital lesion intrinsic to the left superior rectus muscle with associated restricted diffusion. Differential includes metastasis versus lymphoma.    ADVANCE DIRECTIVES:   DNR  NO  Completed on:                     MOLST   NO   Completed on:  Living Will   NO   Completed on: Wife has copy of Living Will

## 2021-01-22 NOTE — PROGRESS NOTE ADULT - ASSESSMENT
This is an 81 yo M S/P Nontraumatic IVC- PMH of Metastatic Prostate Cancer disease progression    Nontraumatic IVC  Syncopy work-up (-)  CT w/wo contrast overnight  RADIOLOGY & ADDITIONAL STUDIES:  < from: MR Head w/wo IV Cont (01.21.21 @ 15:33) >    IMPRESSION:  -Redemonstrated intraventricular hemorrhage. No gross new hemorrhage identified.    -Punctate foci of enhancement and associated FLAIR hyperintense signal involving the medial left precentral gyrus and left inferior occipital lobe. Differential includes metastases versus subacute infarcts.    -1 cm intraorbital lesion intrinsic to the left superior rectus muscle with associated restricted diffusion. Differential includes metastasis versus lymphoma.  Neuro signs stable  resumed diet no swallowing difficulties    Hyponatremia  treatment effective-resolving slowly  Hydration  Follow trends    Metastatic Prostate Cancer  Disease recurrence  Chemo port placed this past Tuesday-planning on chemotherapy  Anxiety related to Cancer recurrence Lexapro should be reordered just prescribed two weeks ago  Results of MR of Brain Metastasis vs Lymphoma may change Heme-Onc future treatment plan  F/U in Community    [facility]  Family support  Amie LINDQUIST on Palliative team reached out to wife Annabel, will F/U while hospitalized here  FULL CODE did not discuss MOLST directive with patient, who has not been told results of MR Head

## 2021-01-22 NOTE — PROGRESS NOTE ADULT - ASSESSMENT
80M h/o former chronic smoker (quit >20 yrs ago), follows with outside cardiologist (Dr. Ellington in [place], on ASA 81mg for primary prevention), HTN, HLD, prostate cancer (s/p resection/radiation), recently diagnosed metastatic cancer suspected of gallbladder origin (initially dx as mets to spine s/p resection 11/2020, chest, liver, adrenal) [place] [facility] [place]. Mus[place]) was planning to start chemotherapy yesterday but then noted to be hyponatremic, first chemo course deferred and given IVF hydration, stopped HCTZ, then returned home around 5pm yesterday while in was standing in the kitchen noted weakness in his legs "just gave out" then fell backward hit his head, reportedly was awake prior the fall, then loss consciousness after he hit the ground, wife found him and went to [facility] found with R-interventricular hemorrhage, transferred here overnight for NSICU.     Fall/syncope suspect as the cause for the cerebral bleed on ASA 81mg, no prodromal cardiac symptoms, baseline EKG and overnight telemetry without arrhythmic event, suspect due to dehydration/hyponatremia for the fall/syncope.       1. Fall/syncope  -no event on telemetry, baseline EKG unremarkable, TTE without explanation for the syncope  -check orthostatic BP  -carotid duplex  -recommend Event monitor as outpatient, defer ILR for now    2. R-interventricular bleed  -traumatic related on ASA 81?  -continue neurosurgery management, no indication for antiplatelet use     3. Hyponatremia  -suspect SIADH vs. HCTZ use  -off hypertonic saline- Na improving, now on salt-tab    4. HTN  -restarted on home atenolol 100/50mg, if not orthostatic then resume losartan at 50mg dose  -no HCTZ use given hyponatremia    5. Metastatic cancer suspected gallbladder etiology  -not yet on chemotherapy  -follow [place] [facility] with his oncologist Dr. Musa  -pancytopenia- continue monitor      Await PT eval., fall precaution  Discussed with his son Bienvenido [phone]  Patient can follow up with his outside cardiologist for Event monitor.       Meng Guajardo DO, [facility]  Faculty Non-Invasive Cardiologist  [phone]

## 2021-01-22 NOTE — PROGRESS NOTE ADULT - ASSESSMENT
80M with a history of gallbladder and prostate cancer with metastatic disease to the spine, hypertension, and gout who initially presented to [facility] after sustaining a fall and was found to have intracranial hemorrhage, hyponatremia, and subsequently transferred to [facility] for further management. The patient was monitored in the intensive care unit and repeat radiological studies were without significant changes, hyponatremia managed hypertonic saline it slowly improved.   Nephrology, Neurosurgery, Hemo/Onco team follows.     #Hyponatremia - most likey multifactorial SIADH and diuretics contributing .  - Na continue to improve  - Nephrology consult noted  - switch to salt tab 1 gm bid, fluid restriction 1.2 L,  - holding diuretics    #Intracranial hemorrhage  - c/w Neuro check q2h  - as per Neurosurgery team >> need LP to r/o leptominingial MTS/lymphoma  - Oncology team follows - [facility] - will discuss need for any in-pt rad/chemo tx   - holding  ASA and DVT ppx as per Neurosurgery team     # Syncoapl episod  #Hypertension   - restarted BB  - Cardiology consult noted  -c/w TELE for next 24-48hr   - Carotid doppler ordered    #Pancytopenia - Suspect secondary to underlying malignancy.  - Oncology team follows    # DVT ppx - SCD   # CODE -full  # Dispo - will stay over weekend, pending work up by Neuro team and plan from Hemo/Onc team

## 2021-01-22 NOTE — CONSULT NOTE ADULT - REASON FOR ADMISSION
Right Intraventricular Hemorrhage

## 2021-01-23 DIAGNOSIS — Z51.5 ENCOUNTER FOR PALLIATIVE CARE: ICD-10-CM

## 2021-01-23 DIAGNOSIS — C22.1 INTRAHEPATIC BILE DUCT CARCINOMA: ICD-10-CM

## 2021-01-23 DIAGNOSIS — Z71.89 OTHER SPECIFIED COUNSELING: ICD-10-CM

## 2021-01-23 DIAGNOSIS — R53.81 OTHER MALAISE: ICD-10-CM

## 2021-01-23 DIAGNOSIS — I61.9 NONTRAUMATIC INTRACEREBRAL HEMORRHAGE, UNSPECIFIED: ICD-10-CM

## 2021-01-23 LAB
ANION GAP SERPL CALC-SCNC: 10 MMOL/L — SIGNIFICANT CHANGE UP (ref 5–17)
ANION GAP SERPL CALC-SCNC: 11 MMOL/L — SIGNIFICANT CHANGE UP (ref 5–17)
BLD GP AB SCN SERPL QL: SIGNIFICANT CHANGE UP
BUN SERPL-MCNC: 20 MG/DL — SIGNIFICANT CHANGE UP (ref 8–20)
BUN SERPL-MCNC: 20 MG/DL — SIGNIFICANT CHANGE UP (ref 8–20)
CALCIUM SERPL-MCNC: 8.5 MG/DL — LOW (ref 8.6–10.2)
CALCIUM SERPL-MCNC: 8.8 MG/DL — SIGNIFICANT CHANGE UP (ref 8.6–10.2)
CHLORIDE SERPL-SCNC: 98 MMOL/L — SIGNIFICANT CHANGE UP (ref 98–107)
CHLORIDE SERPL-SCNC: 99 MMOL/L — SIGNIFICANT CHANGE UP (ref 98–107)
CO2 SERPL-SCNC: 24 MMOL/L — SIGNIFICANT CHANGE UP (ref 22–29)
CO2 SERPL-SCNC: 25 MMOL/L — SIGNIFICANT CHANGE UP (ref 22–29)
CREAT SERPL-MCNC: 1.12 MG/DL — SIGNIFICANT CHANGE UP (ref 0.5–1.3)
CREAT SERPL-MCNC: 1.21 MG/DL — SIGNIFICANT CHANGE UP (ref 0.5–1.3)
DIR ANTIGLOB POLYSPECIFIC INTERPRETATION: SIGNIFICANT CHANGE UP
FERRITIN SERPL-MCNC: 512 NG/ML — HIGH (ref 30–400)
FOLATE SERPL-MCNC: 12.3 NG/ML — SIGNIFICANT CHANGE UP
GLUCOSE SERPL-MCNC: 122 MG/DL — HIGH (ref 70–99)
GLUCOSE SERPL-MCNC: 90 MG/DL — SIGNIFICANT CHANGE UP (ref 70–99)
HCT VFR BLD CALC: 22 % — LOW (ref 39–50)
HGB BLD-MCNC: 7.2 G/DL — LOW (ref 13–17)
IRON SATN MFR SERPL: 19 % — SIGNIFICANT CHANGE UP (ref 16–55)
IRON SATN MFR SERPL: 54 UG/DL — LOW (ref 59–158)
IRON SATN MFR SERPL: 54 UG/DL — LOW (ref 59–158)
LDH SERPL L TO P-CCNC: 269 U/L — HIGH (ref 98–192)
MCHC RBC-ENTMCNC: 30.9 PG — SIGNIFICANT CHANGE UP (ref 27–34)
MCHC RBC-ENTMCNC: 32.7 GM/DL — SIGNIFICANT CHANGE UP (ref 32–36)
MCV RBC AUTO: 94.4 FL — SIGNIFICANT CHANGE UP (ref 80–100)
PLATELET # BLD AUTO: 101 K/UL — LOW (ref 150–400)
POTASSIUM SERPL-MCNC: 3.9 MMOL/L — SIGNIFICANT CHANGE UP (ref 3.5–5.3)
POTASSIUM SERPL-MCNC: 4.6 MMOL/L — SIGNIFICANT CHANGE UP (ref 3.5–5.3)
POTASSIUM SERPL-SCNC: 3.9 MMOL/L — SIGNIFICANT CHANGE UP (ref 3.5–5.3)
POTASSIUM SERPL-SCNC: 4.6 MMOL/L — SIGNIFICANT CHANGE UP (ref 3.5–5.3)
RBC # BLD: 2.33 M/UL — LOW (ref 4.2–5.8)
RBC # BLD: 2.58 M/UL — LOW (ref 4.2–5.8)
RBC # FLD: 16.2 % — HIGH (ref 10.3–14.5)
RETICS #: 123.6 K/UL — SIGNIFICANT CHANGE UP (ref 25–125)
RETICS/RBC NFR: 4.8 % — HIGH (ref 0.5–2.5)
SODIUM SERPL-SCNC: 133 MMOL/L — LOW (ref 135–145)
SODIUM SERPL-SCNC: 133 MMOL/L — LOW (ref 135–145)
TIBC SERPL-MCNC: 282 UG/DL — SIGNIFICANT CHANGE UP (ref 220–430)
TRANSFERRIN SERPL-MCNC: 197 MG/DL — SIGNIFICANT CHANGE UP (ref 180–329)
URATE SERPL-MCNC: 3.6 MG/DL — SIGNIFICANT CHANGE UP (ref 3.4–7)
URATE SERPL-MCNC: 3.9 MG/DL — SIGNIFICANT CHANGE UP (ref 3.4–7)
VIT B12 SERPL-MCNC: 314 PG/ML — SIGNIFICANT CHANGE UP (ref 232–1245)
WBC # BLD: 2.1 K/UL — LOW (ref 3.8–10.5)
WBC # FLD AUTO: 2.1 K/UL — LOW (ref 3.8–10.5)

## 2021-01-23 PROCEDURE — 99232 SBSQ HOSP IP/OBS MODERATE 35: CPT

## 2021-01-23 PROCEDURE — 99233 SBSQ HOSP IP/OBS HIGH 50: CPT

## 2021-01-23 PROCEDURE — 93880 EXTRACRANIAL BILAT STUDY: CPT | Mod: 26

## 2021-01-23 PROCEDURE — 99497 ADVNCD CARE PLAN 30 MIN: CPT

## 2021-01-23 RX ORDER — SENNA PLUS 8.6 MG/1
2 TABLET ORAL AT BEDTIME
Refills: 0 | Status: DISCONTINUED | OUTPATIENT
Start: 2021-01-23 | End: 2021-01-24

## 2021-01-23 RX ORDER — LANOLIN ALCOHOL/MO/W.PET/CERES
5 CREAM (GRAM) TOPICAL AT BEDTIME
Refills: 0 | Status: DISCONTINUED | OUTPATIENT
Start: 2021-01-23 | End: 2021-01-24

## 2021-01-23 RX ADMIN — ATORVASTATIN CALCIUM 10 MILLIGRAM(S): 80 TABLET, FILM COATED ORAL at 21:32

## 2021-01-23 RX ADMIN — SODIUM CHLORIDE 2 GRAM(S): 9 INJECTION INTRAMUSCULAR; INTRAVENOUS; SUBCUTANEOUS at 05:24

## 2021-01-23 RX ADMIN — Medication 300 MILLIGRAM(S): at 12:36

## 2021-01-23 RX ADMIN — Medication 5 MILLIGRAM(S): at 21:32

## 2021-01-23 RX ADMIN — ATENOLOL 100 MILLIGRAM(S): 25 TABLET ORAL at 05:24

## 2021-01-23 RX ADMIN — SODIUM CHLORIDE 2 GRAM(S): 9 INJECTION INTRAMUSCULAR; INTRAVENOUS; SUBCUTANEOUS at 17:01

## 2021-01-23 RX ADMIN — ATENOLOL 50 MILLIGRAM(S): 25 TABLET ORAL at 21:32

## 2021-01-23 NOTE — PROGRESS NOTE ADULT - ASSESSMENT
80[place] s/p syncope [place] [facility] [place] f/u CT brain stable.  MRI w/wo performed, concern for leptomeningeal spread of cancer. 80M s/p syncope fall [place] [facility] [place] f/u CT brain stable.  MRI w/wo performed, concern for leptomeningeal spread of cancer.    -Pt seen and evaluated w/ Dr. Blanco   -recommend  LP to evaluate for leptomeningeal spread     - No neurosurgical contraindications for LP    - Recommend rad/onc consult  - heme/onc following, notes there is concern for metastasis, intraorbital met and leptomeningeal disease, agree with LP and send cytology  - Repeat MRI in 4-6 weeks  - Follow-up as outpatient with Dr. Castro in 1 month  -follow up w/ Dr. Noguera, neuro IR as outpatient in 2 weeks  - Will continue to follow

## 2021-01-23 NOTE — PROGRESS NOTE ADULT - SUBJECTIVE AND OBJECTIVE BOX
80y old  Male who presents with a chief complaint of Right Intraventricular Hemorrhage (22 Jan 2021 14:16)    1/23  - Chart reviewed. Pt seen and examined earlier today.  - Discussed the patient's diagnosis, prognosis, and goals of care. In summary:    1. The patient has a poor understanding of his condition. I tried to give him updates and information. He's fixated on "going home"  2. He says if the cardiologist "cleared him" then that means he's fine  3. I expressed the concern for malignancy spreading to his brain  4. I also asked him about upcoming tests. He was not able to tell me exactly what the next test is except that it has something to do with the "spine"  5. Talked to him about LP and need to r/o LM disease. He was able to understand this somewhat.  6. He wants to pursue all cancer-directed treatment. He says that is his ultimate goal.  7. Also mentioned again, in the end, that he wants to go home    Present Symptoms:     Dyspnea: 0   Nausea/Vomiting:  No  Anxiety:  Yes generalized  Depression:  No  Fatigue: Yes   Loss of appetite: No    Pain: deneis            Character-            Duration-            Effect-            Factors-            Frequency-            Location-            Severity-    Review of Systems: Reviewed                       All others negative    MEDICATIONS  (STANDING):  allopurinol 300 milliGRAM(s) Oral daily  ATENolol  Tablet 100 milliGRAM(s) Oral daily  ATENolol  Tablet 50 milliGRAM(s) Oral at bedtime  atorvastatin 10 milliGRAM(s) Oral at bedtime  melatonin 5 milliGRAM(s) Oral at bedtime  sodium chloride 2 Gram(s) Oral two times a day    MEDICATIONS  (PRN):      Vital Signs Last 24 Hrs  T(C): 37.1 (23 Jan 2021 08:30), Max: 37.1 (23 Jan 2021 08:30)  T(F): 98.7 (23 Jan 2021 08:30), Max: 98.7 (23 Jan 2021 08:30)  HR: 69 (23 Jan 2021 08:30) (68 - 77)  BP: 137/67 (23 Jan 2021 08:30) (134/71 - 145/70)  BP(mean): --  RR: 20 (23 Jan 2021 08:30) (18 - 20)  SpO2: 95% (23 Jan 2021 08:30) (94% - 98%)    GEN: Awake, alert, NAD  HEAD: Normocephalic and atraumatic,   EYES: Anicteric sclerae, EOM's grossly intact  NECK: No JVD, no thyromegaly  PULM: Comfortable work of breathing, clear BS  CV: Pulses 2+ symmetric bilaterally, regular rate and rhythm  ABD: Not distended, soft, non-tender,   EXT: No edema, No deformities  PSYCH: Appropriate mood and affect, no suicidal ideations elicited  NEURO: No facial asymmetry, no tremors, no observed movement disorders  SKIN: No rashes or lesions on exposed skin, No jaundice      LABS:              01-23    133<L>  |  99  |  20.0  ----------------------------<  90  3.9   |  25.0  |  1.12    Ca    8.5<L>      23 Jan 2021 06:40  Phos  2.8     01-22  Mg     1.9     01-22

## 2021-01-23 NOTE — PROGRESS NOTE ADULT - SUBJECTIVE AND OBJECTIVE BOX
[facility] Division of Hospital Medicine    Chief Complaint:  Fall /ich/ hyponatremia    SUBJECTIVE: reports sleepless due to late imaging, denied HA, dizzines, vertigo, CP    OVERNIGHT EVENTS: none reported    Patient denies chest pain, SOB, abd pain, N/V, fever, chills, dysuria or any other complaints. All remainder ROS negative.     MEDICATIONS  (STANDING):  allopurinol 300 milliGRAM(s) Oral daily  ATENolol  Tablet 100 milliGRAM(s) Oral daily  ATENolol  Tablet 50 milliGRAM(s) Oral at bedtime  atorvastatin 10 milliGRAM(s) Oral at bedtime  sodium chloride 1 Gram(s) Oral two times a day    MEDICATIONS  (PRN):        I&O's Summary    2021 07:01  -  2021 07:00  --------------------------------------------------------  IN: 660 mL / OUT: 1200 mL / NET: -540 mL        PHYSICAL EXAM:  Vital Signs Last 24 Hrs  T(C): 36.6 (2021 08:00), Max: 37 (2021 00:00)  T(F): 97.8 (2021 08:00), Max: 98.6 (2021 00:00)  HR: 70 (2021 08:00) (69 - 81)  BP: 147/79 (2021 08:00) (115/52 - 156/72)  BP(mean): 101 (2021 08:00) (68 - 101)  RR: 17 (2021 08:00) (14 - 30)  SpO2: 98% (2021 08:00) (83% - 100%)        PHYSICAL EXAM-  GENERAL: NAD, well-groomed, well-developed  HEAD:  Atraumatic, Normocephalic  EYES: EOMI, PERRLA, conjunctiva and sclera clear  NECK: Supple, No JVD  NERVOUS SYSTEM:  Alert & Oriented X3, Motor Strength 5/5 B/L upper and lower extremities; DTRs 2+ intact and symmetric  CHEST/LUNG: Clear to auscultation bilaterally; No rales, rhonchi, wheezing, or rubs  HEART: Regular rate and rhythm; No murmurs, rubs, or gallops  ABDOMEN: Soft, Nontender, Nondistended; Bowel sounds present  EXTREMITIES:  2+ Peripheral Pulses, No clubbing, cyanosis, or edema  SKIN: No rashes or lesions      LABS:                        7.3    2.34  )-----------( 113      ( 2021 05:44 )             21.9         131<L>  |  96<L>  |  14.0  ----------------------------<  87  3.9   |  25.0  |  1.10    Ca    8.1<L>      2021 05:44  Phos  2.8       Mg     1.9         TPro  5.7<L>  /  Alb  3.2<L>  /  TBili  0.9  /  DBili  x   /  AST  42<H>  /  ALT  19  /  AlkPhos  134<H>      PT/INR - ( 2021 22:30 )   PT: 12.5 sec;   INR: 1.08 ratio         PTT - ( 2021 22:30 )  PTT:28.2 sec      Urinalysis Basic - ( 2021 04:29 )    Color: Yellow / Appearance: Clear / S.015 / pH: x  Gluc: x / Ketone: Moderate  / Bili: Negative / Urobili: Negative mg/dL   Blood: x / Protein: 30 mg/dL / Nitrite: Negative   Leuk Esterase: Trace / RBC: 0-2 /HPF / WBC 0-2   Sq Epi: x / Non Sq Epi: Occasional / Bacteria: x        CAPILLARY BLOOD GLUCOSE            RADIOLOGY & ADDITIONAL TESTS:  Results Reviewed:   Imaging Personally Reviewed:  Electrocardiogram Personally Reviewed:

## 2021-01-23 NOTE — PROGRESS NOTE ADULT - ASSESSMENT
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 for further management. The patient was monitored in the intensive care unit and repeat radiological studies were without significant changes, hyponatremia managed hypertonic saline it slowly improved.   Nephrology, Neurosurgery, Hemo/Onco team follows.     #Hyponatremia - most likey multifactorial SIADH and diuretics contributing   H/O recent Lexapro ( held )  H/O HCTZ ( held )   s/p 3 % Na CL  - Na continue to improve  - Nephrology consult noted  - switch to salt tab 1 gm bid, fluid restriction 1.2 L,  - holding diuretics    Stage IV cholangiocarcinoma, known spine, lung mets, liver mets; was to begin chemotherapy at [facility]  MRI reviewed - there is concern for metastasis , intraorbital met and leptomeningeal disease.  Hemonc suggested LP for cytology & also recommend rad Onc evaluation for possible RT. LP order placed & cannot be done until Monday. Pt feels good & asking if he could get this w/u done at [facility]. Disccussed with Dr. Musa, she will call [facility] for further plan  No neurologic symptoms, not on steroids currently.      #Intracranial hemorrhage  - c/w Neuro check q2h  - as per Neurosurgery team >> need LP to r/o leptominingial MTS/lymphoma  - Oncology team follows - [facility] - plan as above  - holding  ASA and DVT ppx as per Neurosurgery team    No neurosurgical contraindications for LP    - Repeat MRI in 4-6 weeks  - Follow-up as outpatient with Dr. Castro in 1 month  -follow up w/ Dr. Noguera, neuro IR as outpatient in 2 weeks      # Syncopal episode due to orthostatic hypotension  -no event on telemetry, baseline EKG unremarkable, TTE without explanation for the syncope  -orthostatic BP +ve  Add thigh high compression stockings  On fluid restriction currently, will check need for further fluids. Repeat orthostatics once wearing support stockings.   -carotid duplex  -recommend Event monitor as outpatient, defer ILR for now  Cardio following      - restarted BB  - Cardiology consult noted  -c/w TELE for next 24-48hr   - Carotid doppler ordered    #Pancytopenia - Suspect secondary to underlying malignancy.  Hg 7.2  No signs of overt bleeding  check b12,folate, retic count, anemia panel  has not started chemotherapy yet.  Transfuse for Hgb <7  Type & screen  plt stable at 100k  WBC 2.1, please obtain differential      # DVT ppx - SCD   # CODE -full  # Dispo - will stay over weekend, pending work up by Neuro team and plan from Hemo/Onc team

## 2021-01-23 NOTE — PROGRESS NOTE ADULT - SUBJECTIVE AND OBJECTIVE BOX
INTERVAL HPI/OVERNIGHT EVENTS:  79 Y/O male w/ PMHx of prostate CA, HTN, transfer from [facility] after syncopal ground level fall, found w/ right lateral IVH on CT brain. Neurosurgery called for further evaluation. Pt seen and evaluated this morning while pt in bed. No new neurosurgical events reported.       Vital Signs Last 24 Hrs  T(C): 36.7 (23 Jan 2021 05:23), Max: 37 (22 Jan 2021 20:11)  T(F): 98 (23 Jan 2021 05:23), Max: 98.6 (22 Jan 2021 20:11)  HR: 72 (23 Jan 2021 05:23) (68 - 77)  BP: 134/71 (23 Jan 2021 05:23) (134/71 - 145/70)  BP(mean): --  RR: 18 (23 Jan 2021 05:23) (18 - 18)  SpO2: 94% (23 Jan 2021 05:23) (94% - 98%)      PHYSICAL EXAM:  GENERAL: NAD, well-groomed, well-developed  HEAD:  Atraumatic, normocephalic  MENTAL STATUS: AAO x3; Awake; Opens eyes spontaneously; Appropriately conversant without aphasia; following simple commands  CRANIAL NERVES: LJ; EOMI; no facial asymmetry; facial sensation grossly intact to light touch b/l;  tongue midline  MOTOR: strength 5/5 B/L upper and lower extremities; sensation grossly intact all extremities;   CHEST/LUNG: +breath sounds bilaterally; no rales, rhonchi, wheezing, appreciated   HEART: +S1/+S2; Regular rate and rhythm; no murmurs, rubs, appreciated   ABDOMEN: Soft, nontender, nondistended; bowel sounds present   EXTREMITIES:   no clubbing, cyanosis  SKIN: Warm, dry        LABS:                        7.2    2.10  )-----------( 101      ( 23 Jan 2021 06:40 )             22.0     01-23    133<L>  |  99  |  20.0  ----------------------------<  90  3.9   |  25.0  |  1.12    Ca    8.5<L>      23 Jan 2021 06:40  Phos  2.8     01-22  Mg     1.9     01-22 01-22 @ 07:01 - 01-23 @ 07:00  --------------------------------------------------------  IN: 630 mL / OUT: 600 mL / NET: 30 mL        RADIOLOGY & ADDITIONAL TESTS:      EXAM:  CT BRAIN  PROCEDURE DATE:  01/21/2021    IMPRESSION:  Small focus of hemorrhage in the right lateral ventricle is unchanged. No new or enlarging hemorrhage.  1.3 cm nodule in the left orbit is again seen, may represent a small hemangioma; may be further evaluated with MRI.      EXAM:  CT BRAIN  PROCEDURE DATE:  01/22/2021    IMPRESSION:  Unchanged small focus of hemorrhage in the right lateral ventricle. No new or enlarging hemorrhage.  The enhancing foci in the medial left precentral gyrus and left occipital lobe are better seen on recent MRI.  1.3 cm nodule in the left superior rectus muscle is again seen, may represent metastasis versus lymphoma, as seen on MRI.      EXAM:  MR BRAIN WAW IC  PROCEDURE DATE:  01/21/2021    IMPRESSION:  -Redemonstrated intraventricular hemorrhage. No gross new hemorrhage identified.  -Punctate foci of enhancement and associated FLAIR hyperintense signal involving the medial left precentral gyrus and left inferior occipital lobe. Differential includes metastases versus subacute infarcts.  -1 cm intraorbital lesion intrinsic to the left superior rectus muscle with associated restricted diffusion. Differential includes metastasis versus lymphoma.

## 2021-01-23 NOTE — PROGRESS NOTE ADULT - PROBLEM SELECTOR PLAN 4
.  > Patient wants to pursue DMT  > Pending rest of workup with Onc + Rad-Onc eval  > Pls continue clear communication as you are with the patient. He seems to lack insight into his condition.    Total face to face time discussing goals of care, advance care planning, code status and hospice = 16 mins

## 2021-01-23 NOTE — PROGRESS NOTE ADULT - SUBJECTIVE AND OBJECTIVE BOX
HPI: Patient is a 80y Male seen on consultation for the evaluation and management of Stage IV cholangiocarcinoma and prostate cancer.  Pt seen in conjuction with [facility] as part of partnership-pt seen at request of [facility] care team.  Noted previous Onc note, in agreement.  Pt has PMHx of adenoca of prostate 2008, treated qwith salvage RT on 2018, Stage IV cholangiocarcinoma; dz at T9, s/p resection of epidural tumor T8-T10 with post fixation on 12/19/2020.  Received post op SBRT to T 9/T10 and L2/L4 ; established care with Dr. Musa at [facility] with plans to bein tx with [facility]/Ox-not yet begun. Admitted to [facility] after fall/LOC; Transferred to [facility], with stable radiologic changes, poss intraorbital met.    Treated for hyponatremia    Seen at bedside, awake, alert, comfortable, without SOB, chest pain, headaches; no neurologic symptoms, says he sat in the chair for few hours last evening. ready to eat breakfast.      MEDICATIONS  (STANDING):  allopurinol 300 milliGRAM(s) Oral daily  ATENolol  Tablet 100 milliGRAM(s) Oral daily  ATENolol  Tablet 50 milliGRAM(s) Oral at bedtime  atorvastatin 10 milliGRAM(s) Oral at bedtime  sodium chloride 1 Gram(s) Oral two times a day    ICU Vital Signs Last 24 Hrs  T(C): 36.7 (23 Jan 2021 05:23), Max: 37 (22 Jan 2021 20:11)  T(F): 98 (23 Jan 2021 05:23), Max: 98.6 (22 Jan 2021 20:11)  HR: 72 (23 Jan 2021 05:23) (68 - 77)  BP: 134/71 (23 Jan 2021 05:23) (134/71 - 145/70)  BP(mean): --  ABP: --  ABP(mean): --  RR: 18 (23 Jan 2021 05:23) (18 - 18)  SpO2: 94% (23 Jan 2021 05:23) (94% - 98%)      PHYSICAL EXAM:      Constitutional:Awake, alert, comfortable    Eyes:anicteric    Neck:no adenopathy      Respiratory:clear    Cardiovascular:RRR normal S1S2    Gastrointestinal:soft, non-tender, pos BS    Extremities:no edema        LABS:                          7.2    2.10  )-----------( 101      ( 23 Jan 2021 06:40 )             22.0     01-23    133<L>  |  99  |  20.0  ----------------------------<  90  3.9   |  25.0  |  1.12    Ca    8.5<L>      23 Jan 2021 06:40  Phos  2.8     01-22  Mg     1.9     01-22      EXAM:  MR BRAIN WAW IC  PROCEDURE DATE:  01/21/2021    IMPRESSION:  -Redemonstrated intraventricular hemorrhage. No gross new hemorrhage identified.  -Punctate foci of enhancement and associated FLAIR hyperintense signal involving the medial left precentral gyrus and left inferior occipital lobe. Differential includes metastases versus subacute infarcts.  -1 cm intraorbital lesion intrinsic to the left superior rectus muscle with associated restricted diffusion. Differential includes metastasis versus lymphoma.

## 2021-01-23 NOTE — PROGRESS NOTE ADULT - PROBLEM SELECTOR PLAN 5
Thank you for allowing us to participate in your patient's care. We will continue to follow with you. Please page [phone] or contact us via Microsoft Teams for any q's or c's.     Jg Mercedes  Palliative Medicine Thank you for allowing us to participate in your patient's care. We will continue to follow with you. Please contact us via Microsoft Teams for any q's or c's today from 9am to 5pm.

## 2021-01-23 NOTE — PROGRESS NOTE ADULT - PROBLEM SELECTOR PLAN 3
.  > Stage 4  > Heme Onc on board  > LP planned to eval for LM spread  > Rad-Onc consult  > Plan to start DMT

## 2021-01-23 NOTE — PROGRESS NOTE ADULT - SUBJECTIVE AND OBJECTIVE BOX
NEPHROLOGY INTERVAL HPI/OVERNIGHT EVENTS:    No new events.    MEDICATIONS  (STANDING):  allopurinol 300 milliGRAM(s) Oral daily  ATENolol  Tablet 100 milliGRAM(s) Oral daily  ATENolol  Tablet 50 milliGRAM(s) Oral at bedtime  atorvastatin 10 milliGRAM(s) Oral at bedtime  sodium chloride 2 Gram(s) Oral two times a day    MEDICATIONS  (PRN):      Allergies    Allergy Status Unknown  No Known Allergies        Vital Signs Last 24 Hrs  T(C): 36.8 (2021 12:00), Max: 37.1 (2021 08:30)  T(F): 98.3 (2021 12:00), Max: 98.7 (2021 08:30)  HR: 74 (2021 12:00) (68 - 77)  BP: 129/77 (2021 12:00) (129/77 - 145/70)  BP(mean): --  RR: 20 (2021 12:00) (18 - 20)  SpO2: 96% (2021 12:00) (94% - 98%)  T(C): 36.6 (2021 08:00), Max: 37 (2021 00:00)  T(F): 97.8 (2021 08:00), Max: 98.6 (2021 00:00)  HR: 70 (2021 08:00) (70 - 81)  BP: 147/79 (2021 08:00) (115/52 - 156/72)  BP(mean): 101 (2021 08:00) (68 - 101)  RR: 17 (2021 08:00) (14 - 30)  SpO2: 98% (2021 08:00) (83% - 100%)  T(C): 36.6 (2021 08:00), Max: 37 (2021 00:00)  T(F): 97.8 (2021 08:00), Max: 98.6 (2021 00:00)      PHYSICAL EXAM:    GENERAL: Appears comfortable   HEAD:  No HA  EYES:   ENMT:   NECK: right sided PIC site clean  NERVOUS SYSTEM: Alert  oriented   CHEST/LUNG: No wheezes, no  02  HEART: No gallop  ABDOMEN: NT  EXTREMITIES:  Trace edema  LYMPH:   SKIN:    Neg      LABS:        133<L>  |  99  |  20.0  ----------------------------<  90  3.9   |  25.0  |  1.12    Ca    8.5<L>      2021 06:40  Phos  2.8       Mg     1.9                                 7.3    2.34  )-----------( 113      ( 2021 05:44 )             21.9         131<L>  |  96<L>  |  14.0  ----------------------------<  87  3.9   |  25.0  |  1.10    Ca    8.1<L>      2021 05:44  Phos  2.8       Mg     1.9         TPro  5.7<L>  /  Alb  3.2<L>  /  TBili  0.9  /  DBili  x   /  AST  42<H>  /  ALT  19  /  AlkPhos  134<H>      PT/INR - ( 2021 22:30 )   PT: 12.5 sec;   INR: 1.08 ratio         PTT - ( 2021 22:30 )  PTT:28.2 sec  Urinalysis Basic - ( 2021 04:29 )    Color: Yellow / Appearance: Clear / S.015 / pH: x  Gluc: x / Ketone: Moderate  / Bili: Negative / Urobili: Negative mg/dL   Blood: x / Protein: 30 mg/dL / Nitrite: Negative   Leuk Esterase: Trace / RBC: 0-2 /HPF / WBC 0-2   Sq Epi: x / Non Sq Epi: Occasional / Bacteria: x      Magnesium, Serum: 1.9 mg/dL ( @ 05:44)  Phosphorus Level, Serum: 2.8 mg/dL ( @ 05:44)          RADIOLOGY & ADDITIONAL TESTS:

## 2021-01-23 NOTE — PROGRESS NOTE ADULT - ASSESSMENT
Imp:  Stage IV cholangiocarcinoma, known spine, lung mets, liver mets; was to begin chemotherapy at [facility]  Admitted with [facility]  MRI reviewed - there is concern for metastasis , intraorbital met and leptomeningeal disease.  Agree with LP and send cytology.   also recommend rad Onc evaluation for possible RT.   No neurologic symptoms, not on steroids currently.    Pancytopenia- check b12,folate, retic count, DIC profile.  has not started chemotherapy yet.  Transfuse for Hgb <7  plt stable at 100k  WBC 2.1, please obtain differential    Hyponatremia-managed by Nephrology-thought multifactorial, poss SIADH; to monitor  stable Na at 133 today    will discuss with [facility]

## 2021-01-24 ENCOUNTER — TRANSCRIPTION ENCOUNTER (OUTPATIENT)
Age: 81
End: 2021-01-24

## 2021-01-24 VITALS
HEART RATE: 76 BPM | RESPIRATION RATE: 18 BRPM | DIASTOLIC BLOOD PRESSURE: 75 MMHG | SYSTOLIC BLOOD PRESSURE: 154 MMHG | OXYGEN SATURATION: 92 % | TEMPERATURE: 98 F

## 2021-01-24 LAB
ANION GAP SERPL CALC-SCNC: 7 MMOL/L — SIGNIFICANT CHANGE UP (ref 5–17)
ANISOCYTOSIS BLD QL: SLIGHT — SIGNIFICANT CHANGE UP
BASOPHILS # BLD AUTO: 0 K/UL — SIGNIFICANT CHANGE UP (ref 0–0.2)
BASOPHILS NFR BLD AUTO: 0 % — SIGNIFICANT CHANGE UP (ref 0–2)
BUN SERPL-MCNC: 22 MG/DL — HIGH (ref 8–20)
CALCIUM SERPL-MCNC: 8.5 MG/DL — LOW (ref 8.6–10.2)
CHLORIDE SERPL-SCNC: 99 MMOL/L — SIGNIFICANT CHANGE UP (ref 98–107)
CO2 SERPL-SCNC: 27 MMOL/L — SIGNIFICANT CHANGE UP (ref 22–29)
CREAT SERPL-MCNC: 1.11 MG/DL — SIGNIFICANT CHANGE UP (ref 0.5–1.3)
ELLIPTOCYTES BLD QL SMEAR: SLIGHT — SIGNIFICANT CHANGE UP
EOSINOPHIL # BLD AUTO: 0.08 K/UL — SIGNIFICANT CHANGE UP (ref 0–0.5)
EOSINOPHIL NFR BLD AUTO: 3.5 % — SIGNIFICANT CHANGE UP (ref 0–6)
FERRITIN SERPL-MCNC: 476 NG/ML — HIGH (ref 30–400)
GLUCOSE SERPL-MCNC: 91 MG/DL — SIGNIFICANT CHANGE UP (ref 70–99)
HAPTOGLOB SERPL-MCNC: 91 MG/DL — SIGNIFICANT CHANGE UP (ref 34–200)
HCT VFR BLD CALC: 23 % — LOW (ref 39–50)
HGB BLD-MCNC: 7.4 G/DL — LOW (ref 13–17)
IRON SATN MFR SERPL: 20 % — SIGNIFICANT CHANGE UP (ref 16–55)
IRON SATN MFR SERPL: 52 UG/DL — LOW (ref 59–158)
LYMPHOCYTES # BLD AUTO: 0.26 K/UL — LOW (ref 1–3.3)
LYMPHOCYTES # BLD AUTO: 11.3 % — LOW (ref 13–44)
MAGNESIUM SERPL-MCNC: 1.6 MG/DL — SIGNIFICANT CHANGE UP (ref 1.6–2.6)
MANUAL SMEAR VERIFICATION: SIGNIFICANT CHANGE UP
MCHC RBC-ENTMCNC: 30.7 PG — SIGNIFICANT CHANGE UP (ref 27–34)
MCHC RBC-ENTMCNC: 32.2 GM/DL — SIGNIFICANT CHANGE UP (ref 32–36)
MCV RBC AUTO: 95.4 FL — SIGNIFICANT CHANGE UP (ref 80–100)
MONOCYTES # BLD AUTO: 0.2 K/UL — SIGNIFICANT CHANGE UP (ref 0–0.9)
MONOCYTES NFR BLD AUTO: 8.7 % — SIGNIFICANT CHANGE UP (ref 2–14)
NEUTROPHILS # BLD AUTO: 1.74 K/UL — LOW (ref 1.8–7.4)
NEUTROPHILS NFR BLD AUTO: 75.7 % — SIGNIFICANT CHANGE UP (ref 43–77)
NEUTS BAND # BLD: 0.8 % — SIGNIFICANT CHANGE UP (ref 0–8)
OB PNL STL: NEGATIVE — SIGNIFICANT CHANGE UP
OVALOCYTES BLD QL SMEAR: SLIGHT — SIGNIFICANT CHANGE UP
PHOSPHATE SERPL-MCNC: 3.1 MG/DL — SIGNIFICANT CHANGE UP (ref 2.4–4.7)
PLAT MORPH BLD: NORMAL — SIGNIFICANT CHANGE UP
PLATELET # BLD AUTO: 120 K/UL — LOW (ref 150–400)
POIKILOCYTOSIS BLD QL AUTO: SLIGHT — SIGNIFICANT CHANGE UP
POLYCHROMASIA BLD QL SMEAR: SIGNIFICANT CHANGE UP
POTASSIUM SERPL-MCNC: 4.1 MMOL/L — SIGNIFICANT CHANGE UP (ref 3.5–5.3)
POTASSIUM SERPL-SCNC: 4.1 MMOL/L — SIGNIFICANT CHANGE UP (ref 3.5–5.3)
RBC # BLD: 2.41 M/UL — LOW (ref 4.2–5.8)
RBC # FLD: 16.6 % — HIGH (ref 10.3–14.5)
RBC BLD AUTO: ABNORMAL
SODIUM SERPL-SCNC: 133 MMOL/L — LOW (ref 135–145)
TIBC SERPL-MCNC: 260 UG/DL — SIGNIFICANT CHANGE UP (ref 220–430)
TRANSFERRIN SERPL-MCNC: 182 MG/DL — SIGNIFICANT CHANGE UP (ref 180–329)
WBC # BLD: 2.28 K/UL — LOW (ref 3.8–10.5)
WBC # FLD AUTO: 2.28 K/UL — LOW (ref 3.8–10.5)

## 2021-01-24 PROCEDURE — 99239 HOSP IP/OBS DSCHRG MGMT >30: CPT

## 2021-01-24 PROCEDURE — 99232 SBSQ HOSP IP/OBS MODERATE 35: CPT

## 2021-01-24 RX ORDER — SODIUM CHLORIDE 9 MG/ML
2 INJECTION INTRAMUSCULAR; INTRAVENOUS; SUBCUTANEOUS
Qty: 120 | Refills: 0
Start: 2021-01-24 | End: 2021-02-22

## 2021-01-24 RX ORDER — SENNA PLUS 8.6 MG/1
2 TABLET ORAL
Qty: 0 | Refills: 0 | DISCHARGE
Start: 2021-01-24

## 2021-01-24 RX ORDER — LOSARTAN POTASSIUM 100 MG/1
1 TABLET, FILM COATED ORAL
Qty: 0 | Refills: 0 | DISCHARGE

## 2021-01-24 RX ORDER — ATENOLOL 25 MG/1
1 TABLET ORAL
Qty: 0 | Refills: 0 | DISCHARGE
Start: 2021-01-24

## 2021-01-24 RX ORDER — ATENOLOL 25 MG/1
0 TABLET ORAL
Qty: 0 | Refills: 0 | DISCHARGE

## 2021-01-24 RX ADMIN — SODIUM CHLORIDE 2 GRAM(S): 9 INJECTION INTRAMUSCULAR; INTRAVENOUS; SUBCUTANEOUS at 16:23

## 2021-01-24 RX ADMIN — SODIUM CHLORIDE 2 GRAM(S): 9 INJECTION INTRAMUSCULAR; INTRAVENOUS; SUBCUTANEOUS at 04:19

## 2021-01-24 RX ADMIN — Medication 300 MILLIGRAM(S): at 10:20

## 2021-01-24 RX ADMIN — ATENOLOL 100 MILLIGRAM(S): 25 TABLET ORAL at 04:19

## 2021-01-24 NOTE — PROGRESS NOTE ADULT - SUBJECTIVE AND OBJECTIVE BOX
HPI: Patient is a 80y Male seen on consultation for the evaluation and management of Stage IV cholangiocarcinoma and prostate cancer.  Pt seen in conjuction with [facility] as part of partnership-pt seen at request of [facility] care team.  Noted previous Onc note, in agreement.  Pt has PMHx of adenoca of prostate 2008, treated qwith salvage RT on 2018, Stage IV cholangiocarcinoma; dz at T9, s/p resection of epidural tumor T8-T10 with post fixation on 12/19/2020.  Received post op SBRT to T 9/T10 and L2/L4 ; established care with Dr. Musa at [facility] with plans to bein tx with [facility]/Ox-not yet begun. Admitted to [facility] after fall/LOC; Transferred to [facility], with stable radiologic changes, poss intraorbital met.    Treated for hyponatremia    Seen at bedside, awake, alert, comfortable, without SOB, chest pain, headaches; no neurologic symptoms, has some blurry vision at times but overall he seems stable.      MEDICATIONS  (STANDING):  allopurinol 300 milliGRAM(s) Oral daily  ATENolol  Tablet 100 milliGRAM(s) Oral daily  ATENolol  Tablet 50 milliGRAM(s) Oral at bedtime  atorvastatin 10 milliGRAM(s) Oral at bedtime  sodium chloride 1 Gram(s) Oral two times a day    ICU Vital Signs Last 24 Hrs  T(C): 36.7 (24 Jan 2021 08:00), Max: 36.8 (23 Jan 2021 12:00)  T(F): 98 (24 Jan 2021 08:00), Max: 98.3 (23 Jan 2021 12:00)  HR: 70 (24 Jan 2021 08:00) (70 - 77)  BP: 147/74 (24 Jan 2021 08:00) (128/64 - 147/74)  BP(mean): 101 (23 Jan 2021 23:18) (85 - 101)  ABP: --  ABP(mean): --  RR: 18 (24 Jan 2021 08:00) (18 - 20)  SpO2: 98% (24 Jan 2021 08:00) (92% - 98%)      PHYSICAL EXAM:      Constitutional:Awake, alert, comfortable    Eyes:anicteric    Neck:no adenopathy      Respiratory:clear    Cardiovascular:RRR normal S1S2    Gastrointestinal:soft, non-tender, pos BS    Extremities:no edema        LABS:                          7.4    2.28  )-----------( 120      ( 24 Jan 2021 07:15 )             23.0     01-24    133<L>  |  99  |  22.0<H>  ----------------------------<  91  4.1   |  27.0  |  1.11    Ca    8.5<L>      24 Jan 2021 07:15  Phos  3.1     01-24  Mg     1.6     01-24          EXAM:  MR BRAIN WAW IC  PROCEDURE DATE:  01/21/2021    IMPRESSION:  -Redemonstrated intraventricular hemorrhage. No gross new hemorrhage identified.  -Punctate foci of enhancement and associated FLAIR hyperintense signal involving the medial left precentral gyrus and left inferior occipital lobe. Differential includes metastases versus subacute infarcts.  -1 cm intraorbital lesion intrinsic to the left superior rectus muscle with associated restricted diffusion. Differential includes metastasis versus lymphoma.

## 2021-01-24 NOTE — PROGRESS NOTE ADULT - REASON FOR ADMISSION
Right Intraventricular Hemorrhage

## 2021-01-24 NOTE — DISCHARGE NOTE PROVIDER - CARE PROVIDER_API CALL
Sathya Musa)  HematologyOncology; Internal Medicine; Medical Oncology  [address]  [place] [address]  Phone: [phone]  Fax: [phone]  Follow Up Time:     Benji Castro; PhD)  Neurosurgery  [address] [place] [address]  Phone: [phone]  Fax: [phone]  Follow Up Time:     Yamil Noguera)  Neurology; Vascular Neurology  [address] [place] [address]  Phone: [phone]  Fax: [phone]  Follow Up Time:

## 2021-01-24 NOTE — PROGRESS NOTE ADULT - SUBJECTIVE AND OBJECTIVE BOX
NEPHROLOGY INTERVAL HPI/OVERNIGHT EVENTS:    No new events.    MEDICATIONS  (STANDING):  allopurinol 300 milliGRAM(s) Oral daily  ATENolol  Tablet 100 milliGRAM(s) Oral daily  ATENolol  Tablet 50 milliGRAM(s) Oral at bedtime  atorvastatin 10 milliGRAM(s) Oral at bedtime  sodium chloride 2 Gram(s) Oral two times a day    MEDICATIONS  (PRN):      Allergies    Allergy Status Unknown  No Known Allergies        Vital Signs Last 24 Hrs  T(C): 36.7 (2021 08:00), Max: 36.8 (2021 12:00)  T(F): 98 (2021 08:00), Max: 98.3 (2021 12:00)  HR: 70 (2021 08:00) (70 - 77)  BP: 147/74 (2021 08:00) (128/64 - 147/74)  BP(mean): 101 (2021 23:18) (85 - 101)  RR: 18 (2021 08:00) (18 - 20)  SpO2: 98% (2021 08:00) (92% - 98%)  T(C): 36.8 (2021 12:00), Max: 37.1 (2021 08:30)  T(F): 98.3 (2021 12:00), Max: 98.7 (2021 08:30)  HR: 74 (2021 12:00) (68 - 77)  BP: 129/77 (2021 12:00) (129/77 - 145/70)        PHYSICAL EXAM:    GENERAL: Comfortable   HEAD: Same  EYES:   ENMT:   NECK: right sided PIC site clean  NERVOUS SYSTEM: Alert  oriented   CHEST/LUNG: No wheezes, no  02  HEART: No gallop  ABDOMEN: NT  EXTREMITIES:  Trace edema  LYMPH:   SKIN:    Neg      LABS:        133<L>  |  99  |  22.0<H>  ----------------------------<  91  4.1   |  27.0  |  1.11    Ca    8.5<L>      2021 07:15  Phos  3.1       Mg     1.6         133<L>  |  99  |  20.0  ----------------------------<  90  3.9   |  25.0  |  1.12    Ca    8.5<L>      2021 06:40  Phos  2.8       Mg     1.9                                 7.3    2.34  )-----------( 113      ( 2021 05:44 )             21.9         131<L>  |  96<L>  |  14.0  ----------------------------<  87  3.9   |  25.0  |  1.10    Ca    8.1<L>      2021 05:44  Phos  2.8       Mg     1.9         TPro  5.7<L>  /  Alb  3.2<L>  /  TBili  0.9  /  DBili  x   /  AST  42<H>  /  ALT  19  /  AlkPhos  134<H>      PT/INR - ( 2021 22:30 )   PT: 12.5 sec;   INR: 1.08 ratio         PTT - ( 2021 22:30 )  PTT:28.2 sec  Urinalysis Basic - ( 2021 04:29 )    Color: Yellow / Appearance: Clear / S.015 / pH: x  Gluc: x / Ketone: Moderate  / Bili: Negative / Urobili: Negative mg/dL   Blood: x / Protein: 30 mg/dL / Nitrite: Negative   Leuk Esterase: Trace / RBC: 0-2 /HPF / WBC 0-2   Sq Epi: x / Non Sq Epi: Occasional / Bacteria: x      Magnesium, Serum: 1.9 mg/dL ( @ 05:44)  Phosphorus Level, Serum: 2.8 mg/dL ( @ 05:44)          RADIOLOGY & ADDITIONAL TESTS:

## 2021-01-24 NOTE — DISCHARGE NOTE PROVIDER - NSDCMRMEDTOKEN_GEN_ALL_CORE_FT
allopurinol 300 mg oral tablet: 1 tab(s) orally once a day  atenolol 100 mg oral tablet: 1 tab(s) orally once a day  escitalopram 10 mg oral tablet: 1 tab(s) orally once a day  pravastatin 20 mg oral tablet: 1 tab(s) orally once a day  senna oral tablet: 2 tab(s) orally once a day (at bedtime)  sodium chloride 1 g oral tablet: 2 tab(s) orally 2 times a day  Vitamin D3 1000 intl units oral capsule: 1 cap(s) orally once a day

## 2021-01-24 NOTE — PROGRESS NOTE ADULT - ATTENDING COMMENTS
Iron panel, chem in am.
Salt tabs, fluid limit, follow up labs.
COUNSELING:  Telephone meeting to discuss Advanced Care Planning - Documented by  Time Spent ___15___ Minutes.    More than 50% time spent in counseling and coordinating care. __30___ Minutes.     Thank you for the opportunity to assist with the care of this patient.   [facility] Consult Service [phone].
meds same, follow up labs .
NSGY Attg:    see above    patient seen and examined    agree with exam and plan as above
NSGY Attg:    see above    patient seen and examined    agree with exam as above    agree with plan as above  continue supportive care/onc workup per primary team
agree
agree

## 2021-01-24 NOTE — PROGRESS NOTE ADULT - PROVIDER SPECIALTY LIST ADULT
Hospitalist
Hospitalist
Nephrology
Neurosurgery
Cardiology
Heme/Onc
NSICU
Heme/Onc
Nephrology
Neurosurgery
Neurosurgery
Palliative Care
Nephrology
Neurosurgery
Hospitalist
Palliative Care

## 2021-01-24 NOTE — PROGRESS NOTE ADULT - ASSESSMENT
Imp:  Stage IV cholangiocarcinoma, known spine, lung mets, liver mets; [place] [facility] [place]  Admitted with ICH  MRI reviewed - there is concern for metastasis , intraorbital met and leptomeningeal disease.  No neurologic symptoms, not on steroids currently.  discussed his care with [place] [facility] [place] Nikolas Musa, given the fact that he is completely asymptomatic, and the fact that he is going to receive all [place] [facility] if OK with neuro and neurosx patient can be DC and [place] up [place] [facility].      Pancytopenia- check b12,folate, retic count, DIC profile.  has not started chemotherapy yet.  Transfuse for Hgb <7  plt stable at 100k  ANC 1700  Hgb 7.4 today.    Hyponatremia-managed by Nephrology-thought multifactorial, poss SIADH; to monitor  stable Na at 133 today    If ok with medicine team, neuro and neurosx team patient can dc and [place] [facility] [place] to get RT to ? brain mets.  Patient agreeable to plan.

## 2021-01-24 NOTE — DISCHARGE NOTE PROVIDER - HOSPITAL COURSE
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 The patient was monitored in the intensive care unit and repeat radiological studies were without significant changes, hyponatremia managed hypertonic saline it slowly improved.   Nephrology, Neurosurgery, Hemo/Onco team follows.     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  Dr Sathya Musa discussed his care with [place] [facility] [place]olas [place], given the fact that he is completely asymptomatic, and the fact that he is going to receive all [place] [facility] [place] OK with neuro and neurosx patient can be DC and follow [place] [facility]  LP for cytology & also recommend rad Onc evaluation for possible RT can be done as outpatient.   Disccussed with Neurosurgery. recommend  LP to evaluate for leptomeningeal spread, no contraindications from NS standpoint for procedure to be done as an outpatient     Cleared by neurosurgery & hemonc for d/c home with pt to be seen by [facility] early next week.       #Intracranial hemorrhage  - c/w Neuro check q2h  - as per Neurosurgery team >> need LP to r/o leptominingial MTS/lymphoma  - Oncology team follows - [facility] - plan as above  - holding  ASA and DVT ppx as per Neurosurgery team    No neurosurgical contraindications for LP    Disccussed with Neurosurgery. recommend  LP to evaluate for leptomeningeal spread, no contraindications from NS standpoint for procedure to be done as an outpatient     - Repeat MRI in 4-6 weeks  - Follow-up as outpatient with Dr. Castro in 1 month  -follow up w/ Dr. Noguera, neuro IR as outpatient in 2 weeks      # Syncopal episode due to orthostatic hypotension  -no event on telemetry, baseline EKG unremarkable, TTE without explanation for the syncope  -orthostatic BP +ve  Added thigh high compression stockings  On fluid restriction currently, will check need for further fluids. Repeat orthostatics once wearing support stockings.   -carotid duplex -ve  -recommend Event monitor as outpatient, defer ILR for now  D/leandro losartan & reduced dose of metoprolol as discussed with Dr. Bennett Walker for d/c         #Pancytopenia - Suspect secondary to underlying malignancy.  Hg 7.2-->7.4  No signs of overt bleeding  check b12,folate, retic count, anemia panel  has not started chemotherapy yet.  Transfuse for Hgb <7  Pt was offered 1 PRBC as he's being discharged today with no Oncology follow up until Tuesday. Pt refused to be transfused & will f/u with [facility]  plt stable at 100k  WBC 2.1, please obtain differential      PHYSICAL EXAM-  GENERAL: NAD, well-groomed, well-developed  HEAD:  Atraumatic, Normocephalic  EYES: EOMI, PERRLA, conjunctiva and sclera clear  NECK: Supple, No JVD  NERVOUS SYSTEM:  Alert & Oriented X3, Motor Strength 5/5 B/L upper and lower extremities; DTRs 2+ intact and symmetric  CHEST/LUNG: Clear to auscultation bilaterally; No rales, rhonchi, wheezing, or rubs  HEART: Regular rate and rhythm; No murmurs, rubs, or gallops  ABDOMEN: Soft, Nontender, Nondistended; Bowel sounds present  EXTREMITIES:  2+ Peripheral Pulses, No clubbing, cyanosis, or edema  SKIN: No rashes or lesions

## 2021-01-24 NOTE — DISCHARGE NOTE PROVIDER - CARE PROVIDERS DIRECT ADDRESSES
,DirectAddress_Unknown,DirectAddress_Unknown,[email or web address]

## 2021-01-24 NOTE — DISCHARGE NOTE PROVIDER - PROVIDER TOKENS
PROVIDER:[TOKEN:[[id number]]],PROVIDER:[TOKEN:[[id number]]],PROVIDER:[TOKEN:[[id number]]]

## 2021-01-24 NOTE — DISCHARGE NOTE PROVIDER - NSDCCPCAREPLAN_GEN_ALL_CORE_FT
PRINCIPAL DISCHARGE DIAGNOSIS  Diagnosis: ICH (intracerebral hemorrhage)  Assessment and Plan of Treatment: Follow up with your primary doctor within 1 week of discharge.   Follow up with your oncologist Dr. Musa at [facility] [place] [facility] on Monday.  Disccussed with Neurosurgery. recommend  LP to evaluate for leptomeningeal spread, no contraindications from NS standpoint for procedure to be done as an outpatient     - Repeat MRI in 4-6 weeks  - Follow-up as outpatient with Dr. Castro in 1 month  -follow up w/ Dr. Noguera, neuro IR as outpatient in 2 weeks

## 2021-01-24 NOTE — DISCHARGE NOTE NURSING/CASE MANAGEMENT/SOCIAL WORK - PATIENT PORTAL LINK FT
You can access the FollowMyHealth Patient Portal offered by [facility] by registering at the following website: [email or web address]. By joining [facility]’s FollowMyHealth portal, you will also be able to view your health information using other applications (apps) compatible with our system.

## 2021-01-26 RX ORDER — SODIUM CHLORIDE 9 MG/ML
2 INJECTION INTRAMUSCULAR; INTRAVENOUS; SUBCUTANEOUS
Qty: 120 | Refills: 0
Start: 2021-01-26 | End: 2021-02-24

## 2021-01-26 NOTE — CHART NOTE - NSCHARTNOTEFT_GEN_A_CORE
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  Blood sample received on 01/21/21 has a positive antibody screen and autocontrol, and the patient's plasma reacts with the majority of cells tested. The direct antiglobulin test is positive with anti-IgG and anti-C3d AHG. 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 Cold autoantibodies are targeted against “self” antigens on the red cell surface, and react best at temperatures well below body temperature (contrast to warm autoantibodies). Fortunately, they are benign in most people. Occasionally, cold autoantibodies can react in warmer temperatures (i.e., they may have a broad “thermal amplitude“) and can destroy red cells. These antibodies can also interfere with blood typing and compatibility testing, causing delays in and/or prevent finding compatible units for transfusion. Please allow sufficient time for pre-transfusion blood bank workup. 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  Blood sample received on 01/21/21 has a positive antibody screen and autocontrol, and the patient's plasma reacts with the majority of cells tested. The direct antiglobulin test is positive with anti-IgG and anti-C3d AHG. 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 Cold autoantibodies are targeted against “self” antigens on the red cell surface, and react best at temperatures well below body temperature (contrast to warm autoantibodies). Fortunately, they are benign in most people. Occasionally, cold autoantibodies can react in warmer temperatures (i.e., they may have a broad “thermal amplitude“) and can destroy red cells. These antibodies can also interfere with blood typing and compatibility testing, causing delays in and/or prevent finding compatible units for transfusion. Please allow sufficient time for pre-transfusion blood bank workup. 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  Blood sample received on 01/21/21 has a positive antibody screen and autocontrol, and the patient's plasma reacts with the majority of cells tested. 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 Cold autoantibodies are targeted against “self” antigens on the red cell surface, and react best at temperatures well below body temperature (contrast to warm autoantibodies). Fortunately, they are benign in most people. Occasionally, cold autoantibodies can react in warmer temperatures (i.e., they may have a broad “thermal amplitude“) and can destroy red cells. These antibodies can also interfere with blood typing and compatibility testing, causing delays in and/or prevent finding compatible units for transfusion. Please allow sufficient time for pre-transfusion blood bank workup.

## 2021-02-05 PROCEDURE — 86900 BLOOD TYPING SEROLOGIC ABO: CPT

## 2021-02-05 PROCEDURE — 83880 ASSAY OF NATRIURETIC PEPTIDE: CPT

## 2021-02-05 PROCEDURE — 82746 ASSAY OF FOLIC ACID SERUM: CPT

## 2021-02-05 PROCEDURE — 36415 COLL VENOUS BLD VENIPUNCTURE: CPT

## 2021-02-05 PROCEDURE — 86901 BLOOD TYPING SEROLOGIC RH(D): CPT

## 2021-02-05 PROCEDURE — 71045 X-RAY EXAM CHEST 1 VIEW: CPT

## 2021-02-05 PROCEDURE — 86850 RBC ANTIBODY SCREEN: CPT

## 2021-02-05 PROCEDURE — 97535 SELF CARE MNGMENT TRAINING: CPT

## 2021-02-05 PROCEDURE — 70553 MRI BRAIN STEM W/O & W/DYE: CPT

## 2021-02-05 PROCEDURE — 82533 TOTAL CORTISOL: CPT

## 2021-02-05 PROCEDURE — 83550 IRON BINDING TEST: CPT

## 2021-02-05 PROCEDURE — G0390: CPT

## 2021-02-05 PROCEDURE — 82570 ASSAY OF URINE CREATININE: CPT

## 2021-02-05 PROCEDURE — 84100 ASSAY OF PHOSPHORUS: CPT

## 2021-02-05 PROCEDURE — 85045 AUTOMATED RETICULOCYTE COUNT: CPT

## 2021-02-05 PROCEDURE — 83930 ASSAY OF BLOOD OSMOLALITY: CPT

## 2021-02-05 PROCEDURE — 82607 VITAMIN B-12: CPT

## 2021-02-05 PROCEDURE — 97163 PT EVAL HIGH COMPLEX 45 MIN: CPT

## 2021-02-05 PROCEDURE — 85027 COMPLETE CBC AUTOMATED: CPT

## 2021-02-05 PROCEDURE — 80048 BASIC METABOLIC PNL TOTAL CA: CPT

## 2021-02-05 PROCEDURE — 80053 COMPREHEN METABOLIC PANEL: CPT

## 2021-02-05 PROCEDURE — 84300 ASSAY OF URINE SODIUM: CPT

## 2021-02-05 PROCEDURE — 86870 RBC ANTIBODY IDENTIFICATION: CPT

## 2021-02-05 PROCEDURE — 81001 URINALYSIS AUTO W/SCOPE: CPT

## 2021-02-05 PROCEDURE — 93005 ELECTROCARDIOGRAM TRACING: CPT

## 2021-02-05 PROCEDURE — 93306 TTE W/DOPPLER COMPLETE: CPT

## 2021-02-05 PROCEDURE — 97530 THERAPEUTIC ACTIVITIES: CPT

## 2021-02-05 PROCEDURE — 86905 BLOOD TYPING RBC ANTIGENS: CPT

## 2021-02-05 PROCEDURE — 84550 ASSAY OF BLOOD/URIC ACID: CPT

## 2021-02-05 PROCEDURE — 85610 PROTHROMBIN TIME: CPT

## 2021-02-05 PROCEDURE — 84466 ASSAY OF TRANSFERRIN: CPT

## 2021-02-05 PROCEDURE — 85025 COMPLETE CBC W/AUTO DIFF WBC: CPT

## 2021-02-05 PROCEDURE — 99291 CRITICAL CARE FIRST HOUR: CPT | Mod: 25

## 2021-02-05 PROCEDURE — 83615 LACTATE (LD) (LDH) ENZYME: CPT

## 2021-02-05 PROCEDURE — 84443 ASSAY THYROID STIM HORMONE: CPT

## 2021-02-05 PROCEDURE — 83935 ASSAY OF URINE OSMOLALITY: CPT

## 2021-02-05 PROCEDURE — 84439 ASSAY OF FREE THYROXINE: CPT

## 2021-02-05 PROCEDURE — 72170 X-RAY EXAM OF PELVIS: CPT

## 2021-02-05 PROCEDURE — 86769 SARS-COV-2 COVID-19 ANTIBODY: CPT

## 2021-02-05 PROCEDURE — 83540 ASSAY OF IRON: CPT

## 2021-02-05 PROCEDURE — 86880 COOMBS TEST DIRECT: CPT

## 2021-02-05 PROCEDURE — 82962 GLUCOSE BLOOD TEST: CPT

## 2021-02-05 PROCEDURE — 70450 CT HEAD/BRAIN W/O DYE: CPT

## 2021-02-05 PROCEDURE — 85730 THROMBOPLASTIN TIME PARTIAL: CPT

## 2021-02-05 PROCEDURE — 97116 GAIT TRAINING THERAPY: CPT

## 2021-02-05 PROCEDURE — 97167 OT EVAL HIGH COMPLEX 60 MIN: CPT

## 2021-02-05 PROCEDURE — 93880 EXTRACRANIAL BILAT STUDY: CPT

## 2021-02-05 PROCEDURE — 82728 ASSAY OF FERRITIN: CPT

## 2021-02-05 PROCEDURE — 93970 EXTREMITY STUDY: CPT

## 2021-02-05 PROCEDURE — 83735 ASSAY OF MAGNESIUM: CPT

## 2021-02-05 PROCEDURE — 82272 OCCULT BLD FECES 1-3 TESTS: CPT

## 2021-02-05 PROCEDURE — 83010 ASSAY OF HAPTOGLOBIN QUANT: CPT

## 2021-02-05 PROCEDURE — 84436 ASSAY OF TOTAL THYROXINE: CPT

## 2021-02-08 ENCOUNTER — INPATIENT (INPATIENT)
Facility: HOSPITAL | Age: 81
LOS: 2 days | Discharge: HOME | End: 2021-02-11
Attending: FAMILY MEDICINE
Payer: MEDICARE

## 2021-02-08 ENCOUNTER — OUTPATIENT (OUTPATIENT)
Dept: OUTPATIENT SERVICES | Facility: HOSPITAL | Age: 81
LOS: 1 days | End: 2021-02-08

## 2021-02-08 PROCEDURE — 71045 X-RAY EXAM CHEST 1 VIEW: CPT | Mod: 26

## 2021-02-08 PROCEDURE — 93010 ELECTROCARDIOGRAM REPORT: CPT

## 2021-02-08 PROCEDURE — 93970 EXTREMITY STUDY: CPT | Mod: 26

## 2021-02-08 PROCEDURE — 99285 EMERGENCY DEPT VISIT HI MDM: CPT

## 2021-02-10 ENCOUNTER — OUTPATIENT (OUTPATIENT)
Dept: OUTPATIENT SERVICES | Facility: HOSPITAL | Age: 81
LOS: 1 days | End: 2021-02-10

## 2021-02-10 PROCEDURE — 93306 TTE W/DOPPLER COMPLETE: CPT | Mod: 26

## 2021-02-11 ENCOUNTER — OUTPATIENT (OUTPATIENT)
Dept: OUTPATIENT SERVICES | Facility: HOSPITAL | Age: 81
LOS: 1 days | End: 2021-02-11

## 2021-03-18 ENCOUNTER — OUTPATIENT (OUTPATIENT)
Dept: OUTPATIENT SERVICES | Facility: HOSPITAL | Age: 81
LOS: 1 days | End: 2021-03-18

## 2021-03-18 ENCOUNTER — INPATIENT (INPATIENT)
Facility: HOSPITAL | Age: 81
LOS: 0 days | Discharge: HOME | End: 2021-03-19
Admitting: STUDENT IN AN ORGANIZED HEALTH CARE EDUCATION/TRAINING PROGRAM
Payer: MEDICARE

## 2021-03-18 PROCEDURE — 71045 X-RAY EXAM CHEST 1 VIEW: CPT | Mod: 26

## 2021-03-18 PROCEDURE — 99284 EMERGENCY DEPT VISIT MOD MDM: CPT | Mod: CS

## 2021-03-18 PROCEDURE — 93010 ELECTROCARDIOGRAM REPORT: CPT

## 2021-03-19 ENCOUNTER — OUTPATIENT (OUTPATIENT)
Dept: OUTPATIENT SERVICES | Facility: HOSPITAL | Age: 81
LOS: 1 days | End: 2021-03-19

## 2021-05-31 ENCOUNTER — INPATIENT (INPATIENT)
Facility: HOSPITAL | Age: 81
LOS: 7 days | Discharge: HOME | End: 2021-06-08
Attending: STUDENT IN AN ORGANIZED HEALTH CARE EDUCATION/TRAINING PROGRAM | Admitting: FAMILY MEDICINE
Payer: MEDICARE

## 2021-05-31 ENCOUNTER — OUTPATIENT (OUTPATIENT)
Dept: OUTPATIENT SERVICES | Facility: HOSPITAL | Age: 81
LOS: 1 days | End: 2021-05-31

## 2021-05-31 PROCEDURE — 93010 ELECTROCARDIOGRAM REPORT: CPT

## 2021-05-31 PROCEDURE — 99285 EMERGENCY DEPT VISIT HI MDM: CPT

## 2021-05-31 PROCEDURE — 71275 CT ANGIOGRAPHY CHEST: CPT | Mod: 26

## 2021-05-31 PROCEDURE — 93970 EXTREMITY STUDY: CPT | Mod: 26,59

## 2021-05-31 PROCEDURE — 93970 EXTREMITY STUDY: CPT | Mod: 26

## 2021-05-31 PROCEDURE — 71045 X-RAY EXAM CHEST 1 VIEW: CPT | Mod: 26

## 2021-05-31 PROCEDURE — 70450 CT HEAD/BRAIN W/O DYE: CPT | Mod: 26

## 2021-06-01 ENCOUNTER — OUTPATIENT (OUTPATIENT)
Dept: OUTPATIENT SERVICES | Facility: HOSPITAL | Age: 81
LOS: 1 days | End: 2021-06-01

## 2021-06-02 ENCOUNTER — OUTPATIENT (OUTPATIENT)
Dept: OUTPATIENT SERVICES | Facility: HOSPITAL | Age: 81
LOS: 1 days | End: 2021-06-02

## 2021-06-03 ENCOUNTER — OUTPATIENT (OUTPATIENT)
Dept: OUTPATIENT SERVICES | Facility: HOSPITAL | Age: 81
LOS: 1 days | End: 2021-06-03

## 2021-06-03 ENCOUNTER — APPOINTMENT (OUTPATIENT)
Dept: THORACIC SURGERY | Facility: HOSPITAL | Age: 81
End: 2021-06-03

## 2021-06-03 PROCEDURE — 71045 X-RAY EXAM CHEST 1 VIEW: CPT | Mod: 26

## 2021-06-03 PROCEDURE — 32550 INSERT PLEURAL CATH: CPT

## 2021-06-03 PROCEDURE — 32651 THORACOSCOPY REMOVE CORTEX: CPT

## 2021-06-04 ENCOUNTER — OUTPATIENT (OUTPATIENT)
Dept: OUTPATIENT SERVICES | Facility: HOSPITAL | Age: 81
LOS: 1 days | End: 2021-06-04

## 2021-06-05 ENCOUNTER — OUTPATIENT (OUTPATIENT)
Dept: OUTPATIENT SERVICES | Facility: HOSPITAL | Age: 81
LOS: 1 days | End: 2021-06-05

## 2021-06-05 PROCEDURE — 71045 X-RAY EXAM CHEST 1 VIEW: CPT | Mod: 26

## 2021-06-06 ENCOUNTER — OUTPATIENT (OUTPATIENT)
Dept: OUTPATIENT SERVICES | Facility: HOSPITAL | Age: 81
LOS: 1 days | End: 2021-06-06

## 2021-06-07 ENCOUNTER — OUTPATIENT (OUTPATIENT)
Dept: OUTPATIENT SERVICES | Facility: HOSPITAL | Age: 81
LOS: 1 days | End: 2021-06-07

## 2021-06-08 ENCOUNTER — OUTPATIENT (OUTPATIENT)
Dept: OUTPATIENT SERVICES | Facility: HOSPITAL | Age: 81
LOS: 1 days | End: 2021-06-08

## 2021-06-15 PROBLEM — J90 RECURRENT PLEURAL EFFUSION: Status: ACTIVE | Noted: 2021-06-15

## 2021-06-20 NOTE — CONSULT LETTER
[Dear  ___] : Dear  [unfilled], [Courtesy Letter:] : I had the pleasure of seeing your patient, [unfilled], in my office today. [Please see my note below.] : Please see my note below. [Sincerely,] : Sincerely, [FreeTextEntry3] : Dewey Hewitt MD\par Department of Cardiovascular and Thoracic Surgery\par \par [employer]par [employer] [facility]

## 2021-06-20 NOTE — HISTORY OF PRESENT ILLNESS
[FreeTextEntry1] : Mr. ELMORE is a 80 year old male who is status post right thorascopy, partial pulmonary decortication, and insertion of Pleurx catheter for a advances malignancy with recurrent pleural effusion. He is here to discuss his progress.Past  medical  history includes balanitis, gout, prostate cancer,recurrent pleural effusion, acquired deviated nasal septum,and hypertension.  He is here to discuss his progress.

## 2021-06-21 ENCOUNTER — APPOINTMENT (OUTPATIENT)
Dept: THORACIC SURGERY | Facility: CLINIC | Age: 81
End: 2021-06-21

## 2021-06-21 DIAGNOSIS — J90 PLEURAL EFFUSION, NOT ELSEWHERE CLASSIFIED: ICD-10-CM

## 2021-07-19 ENCOUNTER — APPOINTMENT (OUTPATIENT)
Dept: THORACIC SURGERY | Facility: CLINIC | Age: 81
End: 2021-07-19

## 2022-03-14 NOTE — ED PROVIDER NOTE - CARDIOVASCULAR NEGATIVE STATEMENT, MLM
Please schedule EP appt with me next available   normal rate and rhythm, no chest pain and no edema.

## 2022-06-17 NOTE — PROGRESS NOTE ADULT - SUBJECTIVE AND OBJECTIVE BOX
Lab result letter created 06/17/22 and routed to care team to mail to patient.  INTERVAL HPI/OVERNIGHT EVENTS:  81 Y/O male w/ PMHx of prostate CA, HTN, transfer from [facility] [place] after syncopal ground level fall, found w/ right lateral IVH on CT brain. Neurosurgery called for further evaluation. Pt seen and evaluated this morning while pt walking w/ PT. No new neurosurgical events reported.         Vital Signs Last 24 Hrs  T(C): 36.8 (24 Jan 2021 10:00), Max: 36.8 (23 Jan 2021 15:36)  T(F): 98.2 (24 Jan 2021 10:00), Max: 98.3 (23 Jan 2021 19:37)  HR: 72 (24 Jan 2021 11:55) (68 - 77)  BP: 136/72 (24 Jan 2021 10:00) (128/64 - 147/74)  BP(mean): 101 (23 Jan 2021 23:18) (85 - 101)  RR: 18 (24 Jan 2021 10:00) (18 - 18)  SpO2: 95% (24 Jan 2021 10:00) (92% - 98%)      PHYSICAL EXAM:  GENERAL: NAD, well-groomed, well-developed  HEAD:  Atraumatic, normocephalic  MENTAL STATUS: AAO x3; Awake; Opens eyes spontaneously; Appropriately conversant without aphasia; following simple commands  CRANIAL NERVES: LJ; EOMI; no facial asymmetry; facial sensation grossly intact to light touch b/l;  tongue midline  MOTOR: strength 5/5 B/L upper and lower extremities; sensation grossly intact all extremities;   CHEST/LUNG: +breath sounds bilaterally; no rales, rhonchi, wheezing, appreciated   HEART: +S1/+S2; Regular rate and rhythm; no murmurs, rubs, appreciated   ABDOMEN: Soft, nontender, nondistended; bowel sounds present   EXTREMITIES:   no clubbing, cyanosis  SKIN: Warm, dry      LABS:                        7.4    2.28  )-----------( 120      ( 24 Jan 2021 07:15 )             23.0     01-24    133<L>  |  99  |  22.0<H>  ----------------------------<  91  4.1   |  27.0  |  1.11    Ca    8.5<L>      24 Jan 2021 07:15  Phos  3.1     01-24  Mg     1.6     01-24 01-23 @ 07:01 - 01-24 @ 07:00  --------------------------------------------------------  IN: 860 mL / OUT: 510 mL / NET: 350 mL    01-24 @ 07:01 - 01-24 @ 12:09  --------------------------------------------------------  IN: 120 mL / OUT: 0 mL / NET: 120 mL        RADIOLOGY & ADDITIONAL TESTS:      EXAM:  CT BRAIN  PROCEDURE DATE:  01/21/2021    IMPRESSION:  Small focus of hemorrhage in the right lateral ventricle is unchanged. No new or enlarging hemorrhage.  1.3 cm nodule in the left orbit is again seen, may represent a small hemangioma; may be further evaluated with MRI.      EXAM:  CT BRAIN  PROCEDURE DATE:  01/22/2021    IMPRESSION:  Unchanged small focus of hemorrhage in the right lateral ventricle. No new or enlarging hemorrhage.  The enhancing foci in the medial left precentral gyrus and left occipital lobe are better seen on recent MRI.  1.3 cm nodule in the left superior rectus muscle is again seen, may represent metastasis versus lymphoma, as seen on MRI.      EXAM:  MR BRAIN [facility]  PROCEDURE DATE:  01/21/2021    IMPRESSION:  -Redemonstrated intraventricular hemorrhage. No gross new hemorrhage identified.  -Punctate foci of enhancement and associated FLAIR hyperintense signal involving the medial left precentral gyrus and left inferior occipital lobe. Differential includes metastases versus subacute infarcts.  -1 cm intraorbital lesion intrinsic to the left superior rectus muscle with associated restricted diffusion. Differential includes metastasis versus lymphoma.

## 2023-05-30 NOTE — ASU PATIENT PROFILE, ADULT - NSCAFFEAMTFREQ_GEN_ALL_CORE_SD
Information: Selecting Yes will display possible errors in your note based on the variables you have selected. This validation is only offered as a suggestion for you. PLEASE NOTE THAT THE VALIDATION TEXT WILL BE REMOVED WHEN YOU FINALIZE YOUR NOTE. IF YOU WANT TO FAX A PRELIMINARY NOTE YOU WILL NEED TO TOGGLE THIS TO 'NO' IF YOU DO NOT WANT IT IN YOUR FAXED NOTE. 1-2 cups/cans per day

## 2024-02-27 NOTE — CDI QUERY NOTE - NSCDIOTHERTXTBX_GEN_ALL_CORE_HH
There seems to be a conflicting documentation regarding the type if intraventricular hemorrhage, Palliative had noted it non-traumatic while cardio team had written traumatic. Can you please clarify?    A.	Traumatic intraventricular hemorrhage due to fall with 1-2 min LOC  B.	Nontraumatic intraventricular hemorrhage  C.	Other, please specify  D.	Not clinically significant      Supporting documentations:    H&P Adult:  History of Present Illness:   80y Male BIB [facility] [place] [facility] with known right intraventricular hemorrhage without any other isolated injury  Reports he was walking with his walker and then fell likely syncopal vs mechanical, came to with wife trying to arouse him.  Reportedly 1-2min LOC.  Denies bowel or bladder dysfunction.        1/21/21 Consult Note Cardiology:  Fall/syncope suspect as the cause for the cerebral bleed on ASA 81mg, no prodromal cardiac symptoms, baseline EKG and overnight telemetry without arrhythmic event, suspect due to dehydration/hyponatremia for the fall/syncope.   2. R-interventricular bleed  -traumatic related on ASA 81?  -continue neurosurgery management, no indication for antiplatelet use       1/21/21 Consult Note Palliative Care team:  Nontraumatic R lateral Ventricular hemorrhage  CT head results see above  Neuro signs as per protocol  Minimal Neuro deficits assessed at this time  MRI with contrast ordered later today
52.6

## 2024-04-24 NOTE — PROGRESS NOTE ADULT - ASSESSMENT
80M s/p syncope fall transfer from [facility] [place] with right IVH, f/u CT brain stable.  MRI w/wo performed, concern for leptomeningeal spread of cancer.    -Pt seen and evaluated w/ Dr. Blanco   -recommend  LP to evaluate for leptomeningeal spread, no contraindications from NS standpoint for procedure to be done as an outpatient     - No neurosurgical contraindications for LP    - Recommend rad/onc consult  - heme/onc following, notes there is concern for metastasis, intraorbital met and leptomeningeal disease, agree with LP and send cytology  - Repeat MRI in 4-6 weeks  - Follow-up as outpatient with Dr. Castro in 1 month  -follow up w/ Dr. Noguera, neuro IR as outpatient in 2 weeks  - Will continue to follow   9 (severe pain)

## 2025-05-04 NOTE — DISCHARGE NOTE NURSING/CASE MANAGEMENT/SOCIAL WORK - NSDCPETBCESMAN_GEN_ALL_CORE
If you are a smoker, it is important for your health to stop smoking. Please be aware that second hand smoke is also harmful.
Car

## 2025-05-10 NOTE — H&P PST ADULT - VENOUS THROMBOEMBOLISM AGE
[id number]    Afeb. Tachypneic, MD over 75 yrs